# Patient Record
Sex: MALE | Race: WHITE | NOT HISPANIC OR LATINO | Employment: FULL TIME | URBAN - METROPOLITAN AREA
[De-identification: names, ages, dates, MRNs, and addresses within clinical notes are randomized per-mention and may not be internally consistent; named-entity substitution may affect disease eponyms.]

---

## 2019-11-02 ENCOUNTER — HOSPITAL ENCOUNTER (OUTPATIENT)
Dept: RADIOLOGY | Facility: MEDICAL CENTER | Age: 24
End: 2019-11-02
Attending: PHYSICIAN ASSISTANT
Payer: COMMERCIAL

## 2019-11-02 ENCOUNTER — OFFICE VISIT (OUTPATIENT)
Dept: URGENT CARE | Facility: PHYSICIAN GROUP | Age: 24
End: 2019-11-02
Payer: COMMERCIAL

## 2019-11-02 VITALS
HEIGHT: 71 IN | OXYGEN SATURATION: 95 % | DIASTOLIC BLOOD PRESSURE: 110 MMHG | HEART RATE: 76 BPM | SYSTOLIC BLOOD PRESSURE: 140 MMHG | BODY MASS INDEX: 39.62 KG/M2 | WEIGHT: 283 LBS | TEMPERATURE: 97.5 F

## 2019-11-02 DIAGNOSIS — R05.9 COUGH: ICD-10-CM

## 2019-11-02 DIAGNOSIS — R07.89 CHEST DISCOMFORT: ICD-10-CM

## 2019-11-02 DIAGNOSIS — H66.003 NON-RECURRENT ACUTE SUPPURATIVE OTITIS MEDIA OF BOTH EARS WITHOUT SPONTANEOUS RUPTURE OF TYMPANIC MEMBRANES: ICD-10-CM

## 2019-11-02 DIAGNOSIS — J06.9 UPPER RESPIRATORY TRACT INFECTION, UNSPECIFIED TYPE: ICD-10-CM

## 2019-11-02 PROCEDURE — 99204 OFFICE O/P NEW MOD 45 MIN: CPT | Performed by: PHYSICIAN ASSISTANT

## 2019-11-02 PROCEDURE — 71046 X-RAY EXAM CHEST 2 VIEWS: CPT

## 2019-11-02 PROCEDURE — 93000 ELECTROCARDIOGRAM COMPLETE: CPT | Performed by: PHYSICIAN ASSISTANT

## 2019-11-02 RX ORDER — FLUTICASONE PROPIONATE 50 MCG
1 SPRAY, SUSPENSION (ML) NASAL DAILY
Qty: 16 G | Refills: 0 | Status: SHIPPED | OUTPATIENT
Start: 2019-11-02 | End: 2020-08-24

## 2019-11-02 RX ORDER — CODEINE PHOSPHATE/GUAIFENESIN 10-100MG/5
5 LIQUID (ML) ORAL 3 TIMES DAILY PRN
Qty: 120 ML | Refills: 0 | Status: SHIPPED | OUTPATIENT
Start: 2019-11-02 | End: 2019-11-09

## 2019-11-02 RX ORDER — BENZONATATE 100 MG/1
100 CAPSULE ORAL 3 TIMES DAILY PRN
Qty: 30 CAP | Refills: 0 | Status: SHIPPED | OUTPATIENT
Start: 2019-11-02 | End: 2020-08-24

## 2019-11-02 RX ORDER — AMOXICILLIN AND CLAVULANATE POTASSIUM 875; 125 MG/1; MG/1
1 TABLET, FILM COATED ORAL 2 TIMES DAILY
Qty: 20 TAB | Refills: 0 | Status: SHIPPED | OUTPATIENT
Start: 2019-11-02 | End: 2019-11-12

## 2019-11-02 ASSESSMENT — ENCOUNTER SYMPTOMS
RHINORRHEA: 1
SINUS PAIN: 1
ABDOMINAL PAIN: 0
CONSTIPATION: 0
SHORTNESS OF BREATH: 0
HEMOPTYSIS: 0
DIZZINESS: 0
VOMITING: 0
SORE THROAT: 1
BLOOD IN STOOL: 0
EYES NEGATIVE: 1
HEARTBURN: 0
DIARRHEA: 0
SPUTUM PRODUCTION: 1
FEVER: 1
CHILLS: 1
WHEEZING: 0
HEADACHES: 0
COUGH: 1
NAUSEA: 0
PALPITATIONS: 0

## 2019-11-02 NOTE — LETTER
November 2, 2019         Patient: Mitchel Toussaint   YOB: 1995   Date of Visit: 11/2/2019           To Whom it May Concern:    Mitchel Toussaint was seen in my clinic on 11/2/2019. He is excused 0/11/02/2019, 11/03/2019 and 11/04/2019.    If you have any questions or concerns, please don't hesitate to call.        Sincerely,           Shorty Razo P.A.-C.  Electronically Signed

## 2019-11-02 NOTE — PROGRESS NOTES
Subjective:   Mitchel Toussaint is a 24 y.o. male who presents for Nasal Congestion (Pt reports chest congestion, sore thoat, cough, chill, body aches, fatigue. onset four days.)        Patient complains of central chest discomfort, cough, sore throat, chills, sweats, body aches, fatigue x4 days.  He states chest discomfort is substernal, constant, aching.  3 out of 10.  Does not change with position or exertion. Pain worsened by coughing. Does not think it worsens with eating or drinking. He is also having some intermittent ear pain for the past several days.  No personal cardiac history.  His grandmother  of heart attack at age 55.  Patient does use chewing tobacco.    URI    This is a new problem. The current episode started in the past 7 days. The problem has been rapidly worsening. Maximum temperature: subjective fever. The fever has been present for 1 to 2 days. Associated symptoms include chest pain, congestion, coughing, ear pain, a plugged ear sensation, rhinorrhea, sinus pain and a sore throat. Pertinent negatives include no abdominal pain, diarrhea, dysuria, headaches, joint pain, joint swelling, nausea, rash, sneezing, vomiting or wheezing. He has tried nothing for the symptoms. The treatment provided no relief.     Review of Systems   Constitutional: Positive for chills, fever and malaise/fatigue.   HENT: Positive for congestion, ear pain, rhinorrhea, sinus pain and sore throat. Negative for sneezing.    Eyes: Negative.    Respiratory: Positive for cough and sputum production. Negative for hemoptysis, shortness of breath and wheezing.    Cardiovascular: Positive for chest pain. Negative for palpitations and leg swelling.   Gastrointestinal: Negative for abdominal pain, blood in stool, constipation, diarrhea, heartburn, nausea and vomiting.   Genitourinary: Negative for dysuria.   Musculoskeletal: Negative for joint pain.   Skin: Negative for rash.   Neurological: Negative for dizziness  "and headaches.   All other systems reviewed and are negative.      PMH:  has no past medical history of Asthma.  MEDS:   Current Outpatient Medications:   •  hyoscyamine-maalox plus-lidocaine viscous (GI COCKTAIL), Take 15 mL by mouth Once for 1 dose., Disp: 15 mL, Rfl: 0  •  amoxicillin-clavulanate (AUGMENTIN) 875-125 MG Tab, Take 1 Tab by mouth 2 times a day for 10 days., Disp: 20 Tab, Rfl: 0  •  fluticasone (FLONASE) 50 MCG/ACT nasal spray, Spray 1 Spray in nose every day., Disp: 16 g, Rfl: 0  •  benzonatate (TESSALON) 100 MG Cap, Take 1 Cap by mouth 3 times a day as needed., Disp: 30 Cap, Rfl: 0  •  guaifenesin-codeine (TUSSI-ORGANIDIN NR) 100-10 MG/5ML syrup, Take 5 mL by mouth 3 times a day as needed for Cough for up to 7 days., Disp: 120 mL, Rfl: 0  •  oxycodone-acetaminophen (PERCOCET) 5-325 MG TABS, Take 1-2 Tabs by mouth every four hours as needed., Disp: , Rfl:   ALLERGIES: No Known Allergies  SURGHX:   Past Surgical History:   Procedure Laterality Date   • IRRIGATION & DEBRIDEMENT GENERAL  5/21/2013    Performed by Dwight Pool M.D. at SURGERY SAME DAY [facility] ORS     SOCHX:  reports that he quit smoking about 4 years ago. His smokeless tobacco use includes chew. He reports that he drinks alcohol. He reports that he has current or past drug history.  FH: Family history was reviewed, no pertinent findings to report   Objective:   /110 (BP Location: Left arm, Patient Position: Standing, BP Cuff Size: Large adult)   Pulse 76   Temp 36.4 °C (97.5 °F) (Temporal)   Ht 1.803 m (5' 11\")   Wt (!) 128.4 kg (283 lb)   SpO2 95%   BMI 39.47 kg/m²   Physical Exam   Constitutional: He is oriented to person, place, and time. Vital signs are normal. He appears well-developed and well-nourished.  Non-toxic appearance. No distress.   HENT:   Head: Normocephalic and atraumatic.   Right Ear: External ear and ear canal normal.   Left Ear: External ear and ear canal normal.   Nose: Mucosal edema and " rhinorrhea present. Right sinus exhibits maxillary sinus tenderness. Left sinus exhibits maxillary sinus tenderness.   Mouth/Throat: Uvula is midline and mucous membranes are normal. Posterior oropharyngeal erythema present.   TMs are bulging, opaque white with significant central injection bilaterally.  Loss of light reflex bilaterally.   Eyes: Conjunctivae and lids are normal.   Neck: Neck supple.   Cardiovascular: Normal rate, regular rhythm, S1 normal, S2 normal and normal heart sounds. Exam reveals no gallop and no friction rub.   No murmur heard.  Chest wall and sternum nontender to palpation.  No change in symptoms when patient is made to lean forward or when moved into supine position.   Pulmonary/Chest: Effort normal and breath sounds normal. No respiratory distress. He has no decreased breath sounds. He has no wheezes. He has no rhonchi. He has no rales.   Abdominal: Normal appearance.   Musculoskeletal:   Lower extremities are nonedematous and nontender to palpation.   Lymphadenopathy:     He has no cervical adenopathy.        Right cervical: No superficial cervical and no posterior cervical adenopathy present.       Left cervical: No superficial cervical and no posterior cervical adenopathy present.   Neurological: He is alert and oriented to person, place, and time. No cranial nerve deficit or sensory deficit.   Skin: Skin is warm, dry and intact. Capillary refill takes less than 2 seconds.   Psychiatric: He has a normal mood and affect. His speech is normal and behavior is normal. Judgment and thought content normal. Cognition and memory are normal.   Vitals reviewed.        Assessment/Plan:   1. Chest discomfort  - EKG - Clinic Performed  - DX-CHEST-2 VIEWS; Future  - hyoscyamine-maalox plus-lidocaine viscous (GI COCKTAIL); Take 15 mL by mouth Once for 1 dose.  Dispense: 15 mL; Refill: 0  - REFERRAL TO CARDIOLOGY    2. Upper respiratory tract infection, unspecified type  - amoxicillin-clavulanate  (AUGMENTIN) 875-125 MG Tab; Take 1 Tab by mouth 2 times a day for 10 days.  Dispense: 20 Tab; Refill: 0  - fluticasone (FLONASE) 50 MCG/ACT nasal spray; Spray 1 Spray in nose every day.  Dispense: 16 g; Refill: 0    3. Non-recurrent acute suppurative otitis media of both ears without spontaneous rupture of tympanic membranes  - amoxicillin-clavulanate (AUGMENTIN) 875-125 MG Tab; Take 1 Tab by mouth 2 times a day for 10 days.  Dispense: 20 Tab; Refill: 0  - fluticasone (FLONASE) 50 MCG/ACT nasal spray; Spray 1 Spray in nose every day.  Dispense: 16 g; Refill: 0    4. Cough  - benzonatate (TESSALON) 100 MG Cap; Take 1 Cap by mouth 3 times a day as needed.  Dispense: 30 Cap; Refill: 0  - guaifenesin-codeine (TUSSI-ORGANIDIN NR) 100-10 MG/5ML syrup; Take 5 mL by mouth 3 times a day as needed for Cough for up to 7 days.  Dispense: 120 mL; Refill: 0    Patient presents primarily with concerns about upper respiratory symptoms however he is having some new onset, mild atypical chest pain.  Physical exam consistent with URI and bilateral acute otitis media.  Cardiac auscultation unremarkable.  No positional changes in pain.  EKG and chest x-ray obtained to further evaluate.    EKG: NSR, rate: 68  , normal axis, mild widening of QRS, normal intervals, no ST segment elevation or depression, no hypertrophy.    FINDINGS:    The cardiac silhouette  and mediastinal contours are normal.    No discrete opacity, pleural fluid or pneumothorax.    No suspicious bony lesions.          Impression       No acute cardiopulmonary findings.     Patient advised that chest x-ray is normal but he does have mild widening of the QRS which is suspicion for possible conduction abnormality.  No EKG available for comparison.  Differential discussed with patient to include myocarditis and pericarditis.  However based on physical exam and EKG clinical suspicion for these pathologies is low.    GI cocktail administered in clinic to help determine if the  pain may be emanating from the GI system.  He does report moderate symptomatic improvement with a GI cocktail, but symptoms do not completely resolve.  He is well-appearing and vital signs stable.  He does not appear to be in any immediate danger.  Patient advised that if his symptoms persist I would like him to be evaluated by cardiology.  Referral placed.  He was also given strict ED precautions.  If pain worsens or changes in nature, he develops dyspnea or shortness of breath, nausea, vomiting, lightheadedness, dizziness-he needs to go to the ER for reevaluation.    As coughing seems to be exacerbating his chest discomfort I will prescribe him antitussives to help control this.  Additionally he is started on antibiotics for bilateral ear infections.  Start Flonase.  If patient's symptoms worsen at any point or fail to significantly improve in the next 72 hours he should return to clinic for reevaluation.     Differential diagnosis, natural history, supportive care, and indications for immediate follow-up discussed.

## 2020-08-24 ENCOUNTER — OFFICE VISIT (OUTPATIENT)
Dept: URGENT CARE | Facility: PHYSICIAN GROUP | Age: 25
End: 2020-08-24
Payer: COMMERCIAL

## 2020-08-24 VITALS
TEMPERATURE: 97.7 F | HEIGHT: 71 IN | SYSTOLIC BLOOD PRESSURE: 108 MMHG | OXYGEN SATURATION: 97 % | HEART RATE: 66 BPM | DIASTOLIC BLOOD PRESSURE: 80 MMHG | WEIGHT: 300 LBS | RESPIRATION RATE: 16 BRPM | BODY MASS INDEX: 42 KG/M2

## 2020-08-24 DIAGNOSIS — G47.00 INSOMNIA, UNSPECIFIED TYPE: ICD-10-CM

## 2020-08-24 PROCEDURE — 99214 OFFICE O/P EST MOD 30 MIN: CPT | Performed by: PHYSICIAN ASSISTANT

## 2020-08-24 RX ORDER — HYDROXYZINE HYDROCHLORIDE 25 MG/1
25 TABLET, FILM COATED ORAL
Qty: 30 TAB | Refills: 1 | Status: SHIPPED | OUTPATIENT
Start: 2020-08-24 | End: 2020-09-23

## 2020-08-24 ASSESSMENT — ENCOUNTER SYMPTOMS
DEPRESSION: 0
SHORTNESS OF BREATH: 0
ABDOMINAL PAIN: 0
NERVOUS/ANXIOUS: 1
HEADACHES: 0
INSOMNIA: 1
DIARRHEA: 0
NAUSEA: 0
CONSTIPATION: 0
FEVER: 0
HALLUCINATIONS: 0
VOMITING: 0
SORE THROAT: 0
EYE PAIN: 0
CHILLS: 0
COUGH: 0
MYALGIAS: 0

## 2020-08-24 NOTE — PATIENT INSTRUCTIONS
· SLEEP HYGIENE!   · 1 cup of coffee only when waking up  · No screen time before bed, no bright lights 2 hours before bed  · If you cannot sleep get out of bed and read in the next room.  Use your bed only for sleeping and intercourse  · No daytime napping  · Wake up at the same time every day regardless of how much you've slept  · Stress reduction  · Exercise during the day no matter what - you need to get tired in order to sleep  · No alcohol  · - try noise machine in bedroom ([employer] or similar)    MEDICATIONS  · Melatonin 3mg 1 hour before bed  · Hot shower one hour before bed  · Magnesium glycinate (not citrate!) before bed  · Trial of hydroxyzine - take 1 hour before bed.

## 2020-08-25 NOTE — PROGRESS NOTES
Subjective:   Mitchel Toussaint is a 25 y.o. male who presents for Unable to Sleep ([id number], R wrist [id number])      This is a 25-year-old male has had increased stress lately and reports that he has had difficulty sleeping for around 3 weeks.  He has had around 3 hours of sleep per night and reports that he typically has difficulty falling asleep.  He reports that he has been compensating by drinking 4 to 5 cups of coffee a day.  He has not had any night sweats.  He is very concerned because he feels uncomfortable during the day and feels a lack of energy.  He said difficulty focusing.  He has not tried any over-the-counter supplements aside from a CBD supplement which did not seem to help      Review of Systems   Constitutional: Negative for chills, fever and malaise/fatigue.   HENT: Negative for congestion, ear pain and sore throat.    Eyes: Negative for pain.   Respiratory: Negative for cough and shortness of breath.    Cardiovascular: Negative for chest pain.   Gastrointestinal: Negative for abdominal pain, constipation, diarrhea, nausea and vomiting.   Genitourinary: Negative for dysuria.   Musculoskeletal: Negative for myalgias.   Skin: Negative for rash.   Neurological: Negative for headaches.   Psychiatric/Behavioral: Negative for depression, hallucinations and suicidal ideas. The patient is nervous/anxious and has insomnia.        Medications:    • hydrOXYzine HCl Tabs  • oxyCODONE-acetaminophen Tabs    Allergies: Patient has no known allergies.    Problem List: Mitchel Toussaint has Pilonidal cyst with abscess on their problem list.    Surgical History:  Past Surgical History:   Procedure Laterality Date   • IRRIGATION & DEBRIDEMENT GENERAL  5/21/2013    Performed by Dwight Pool M.D. at SURGERY SAME DAY [facility]       Past Social Hx: Mitchel Toussaint  reports that he quit smoking about 4 years ago. His smokeless tobacco use includes chew. He reports  "current alcohol use. He reports previous drug use.     Past Family Hx:  Mitchel Toussaint family history is not on file.     Problem list, medications, and allergies reviewed by myself today in Epic.     Objective:     /80   Pulse 66   Temp 36.5 °C (97.7 °F) (Temporal)   Resp 16   Ht 1.803 m (5' 11\")   Wt (!) 136.1 kg (300 lb)   SpO2 97%   BMI 41.84 kg/m²     Physical Exam  Vitals signs reviewed.   Constitutional:       Appearance: Normal appearance.   HENT:      Head: Normocephalic and atraumatic.      Right Ear: External ear normal.      Left Ear: External ear normal.      Nose: Nose normal.      Mouth/Throat:      Mouth: Mucous membranes are moist.   Eyes:      Conjunctiva/sclera: Conjunctivae normal.   Cardiovascular:      Rate and Rhythm: Normal rate and regular rhythm.      Heart sounds: Normal heart sounds.   Pulmonary:      Effort: Pulmonary effort is normal.      Breath sounds: Normal breath sounds.   Skin:     General: Skin is warm and dry.      Capillary Refill: Capillary refill takes less than 2 seconds.   Neurological:      General: No focal deficit present.      Mental Status: He is alert and oriented to person, place, and time.         Assessment/Plan:     Diagnosis and associated orders:     1. Insomnia, unspecified type  REFERRAL TO FOLLOW-UP WITH PRIMARY CARE    hydrOXYzine HCl (ATARAX) 25 MG Tab      Comments/MDM:     • See AVS  • I had a very long conversation (greater than 15 minutes) of face-to-face counseling about sleep hygiene, lifestyle changes, stress reduction, specific recommendations as well as over-the-counter things for the patient to try.  I provided him the printed AVS and went over the instructions with him and gave him return precautions.  I also put in referral for primary care         Differential diagnosis, natural history, supportive care, and indications for immediate follow-up discussed.    Advised the patient to follow-up with the primary care " physician for recheck, reevaluation, and consideration of further management.    Please note that this dictation was created using voice recognition software. I have made a reasonable attempt to correct obvious errors, but I expect that there are errors of grammar and possibly content that I did not discover before finalizing the note.    This note was electronically signed by Abdoulaye Yang PA-C

## 2020-10-05 ENCOUNTER — NURSE TRIAGE (OUTPATIENT)
Dept: HEALTH INFORMATION MANAGEMENT | Facility: OTHER | Age: 25
End: 2020-10-05

## 2020-10-05 NOTE — TELEPHONE ENCOUNTER
1. Caller Name: Mitchel Toussaint              Call Back Number: [phone]  [facility] PCP or Specialty Provider: No          2.  In the last two weeks, has the patient had any new or worsening symptoms (not explained by alternative diagnosis)? Yes, the patient reports the following COVID-19 consistent symptoms: congestion or runny nose and nausea, left thumb swelling.     3.  Does patient have any comoribidities? None     4.  Has the patient traveled in the last 14 days OR had any known contact with someone who is suspected or confirmed to have COVID-19?  Yes, exposed to friend who tested positive for Covid.     5. POTENTIAL PUI (LOW): Advised to perform self care, monitor for worsening symptoms and to call back in 3 days if no improvement. Instructed to self isolate and contact [facility] at [phone]         Note routed to [facility] Provider: [facility] only.

## 2021-04-28 ENCOUNTER — ANESTHESIA EVENT (OUTPATIENT)
Dept: SURGERY | Facility: MEDICAL CENTER | Age: 26
End: 2021-04-28

## 2021-04-28 ENCOUNTER — APPOINTMENT (OUTPATIENT)
Dept: RADIOLOGY | Facility: MEDICAL CENTER | Age: 26
End: 2021-04-28
Attending: EMERGENCY MEDICINE

## 2021-04-28 ENCOUNTER — HOSPITAL ENCOUNTER (OUTPATIENT)
Facility: MEDICAL CENTER | Age: 26
End: 2021-04-29
Attending: EMERGENCY MEDICINE | Admitting: SURGERY

## 2021-04-28 ENCOUNTER — ANESTHESIA (OUTPATIENT)
Dept: SURGERY | Facility: MEDICAL CENTER | Age: 26
End: 2021-04-28

## 2021-04-28 DIAGNOSIS — G89.18 POSTOPERATIVE PAIN: ICD-10-CM

## 2021-04-28 DIAGNOSIS — K81.9 CHOLECYSTITIS: ICD-10-CM

## 2021-04-28 LAB
ALBUMIN SERPL BCP-MCNC: 4.8 G/DL (ref 3.2–4.9)
ALBUMIN/GLOB SERPL: 1.5 G/DL
ALP SERPL-CCNC: 84 U/L (ref 30–99)
ALT SERPL-CCNC: 80 U/L (ref 2–50)
ANION GAP SERPL CALC-SCNC: 15 MMOL/L (ref 7–16)
AST SERPL-CCNC: 39 U/L (ref 12–45)
BASOPHILS # BLD AUTO: 0.2 % (ref 0–1.8)
BASOPHILS # BLD: 0.02 K/UL (ref 0–0.12)
BILIRUB SERPL-MCNC: 0.8 MG/DL (ref 0.1–1.5)
BUN SERPL-MCNC: 18 MG/DL (ref 8–22)
CALCIUM SERPL-MCNC: 9.5 MG/DL (ref 8.5–10.5)
CHLORIDE SERPL-SCNC: 105 MMOL/L (ref 96–112)
CO2 SERPL-SCNC: 20 MMOL/L (ref 20–33)
CREAT SERPL-MCNC: 0.67 MG/DL (ref 0.5–1.4)
EKG IMPRESSION: NORMAL
EOSINOPHIL # BLD AUTO: 0.15 K/UL (ref 0–0.51)
EOSINOPHIL NFR BLD: 1.4 % (ref 0–6.9)
ERYTHROCYTE [DISTWIDTH] IN BLOOD BY AUTOMATED COUNT: 40.7 FL (ref 35.9–50)
GLOBULIN SER CALC-MCNC: 3.1 G/DL (ref 1.9–3.5)
GLUCOSE SERPL-MCNC: 115 MG/DL (ref 65–99)
HCT VFR BLD AUTO: 48.3 % (ref 42–52)
HGB BLD-MCNC: 16.3 G/DL (ref 14–18)
IMM GRANULOCYTES # BLD AUTO: 0.05 K/UL (ref 0–0.11)
IMM GRANULOCYTES NFR BLD AUTO: 0.5 % (ref 0–0.9)
LIPASE SERPL-CCNC: 24 U/L (ref 11–82)
LYMPHOCYTES # BLD AUTO: 0.45 K/UL (ref 1–4.8)
LYMPHOCYTES NFR BLD: 4.1 % (ref 22–41)
MCH RBC QN AUTO: 28.3 PG (ref 27–33)
MCHC RBC AUTO-ENTMCNC: 33.7 G/DL (ref 33.7–35.3)
MCV RBC AUTO: 84 FL (ref 81.4–97.8)
MONOCYTES # BLD AUTO: 0.41 K/UL (ref 0–0.85)
MONOCYTES NFR BLD AUTO: 3.7 % (ref 0–13.4)
NEUTROPHILS # BLD AUTO: 9.91 K/UL (ref 1.82–7.42)
NEUTROPHILS NFR BLD: 90.1 % (ref 44–72)
NRBC # BLD AUTO: 0 K/UL
NRBC BLD-RTO: 0 /100 WBC
PLATELET # BLD AUTO: 291 K/UL (ref 164–446)
PMV BLD AUTO: 9.2 FL (ref 9–12.9)
POTASSIUM SERPL-SCNC: 3.8 MMOL/L (ref 3.6–5.5)
PROT SERPL-MCNC: 7.9 G/DL (ref 6–8.2)
RBC # BLD AUTO: 5.75 M/UL (ref 4.7–6.1)
SARS-COV+SARS-COV-2 AG RESP QL IA.RAPID: NOTDETECTED
SARS-COV-2 RNA RESP QL NAA+PROBE: NOTDETECTED
SODIUM SERPL-SCNC: 140 MMOL/L (ref 135–145)
SPECIMEN SOURCE: NORMAL
SPECIMEN SOURCE: NORMAL
WBC # BLD AUTO: 11 K/UL (ref 4.8–10.8)

## 2021-04-28 PROCEDURE — 500514 HCHG ENDOCLIP: Performed by: SURGERY

## 2021-04-28 PROCEDURE — 160035 HCHG PACU - 1ST 60 MINS PHASE I: Performed by: SURGERY

## 2021-04-28 PROCEDURE — 160048 HCHG OR STATISTICAL LEVEL 1-5: Performed by: SURGERY

## 2021-04-28 PROCEDURE — 88304 TISSUE EXAM BY PATHOLOGIST: CPT

## 2021-04-28 PROCEDURE — 501338 HCHG SHEARS, ENDO: Performed by: SURGERY

## 2021-04-28 PROCEDURE — 83690 ASSAY OF LIPASE: CPT

## 2021-04-28 PROCEDURE — 87426 SARSCOV CORONAVIRUS AG IA: CPT

## 2021-04-28 PROCEDURE — 160009 HCHG ANES TIME/MIN: Performed by: SURGERY

## 2021-04-28 PROCEDURE — 501568 HCHG TROCAR, BLUNTPORT 12MM: Performed by: SURGERY

## 2021-04-28 PROCEDURE — 700105 HCHG RX REV CODE 258: Performed by: ANESTHESIOLOGY

## 2021-04-28 PROCEDURE — 700111 HCHG RX REV CODE 636 W/ 250 OVERRIDE (IP): Performed by: ANESTHESIOLOGY

## 2021-04-28 PROCEDURE — 160029 HCHG SURGERY MINUTES - 1ST 30 MINS LEVEL 4: Performed by: SURGERY

## 2021-04-28 PROCEDURE — 93005 ELECTROCARDIOGRAM TRACING: CPT | Performed by: EMERGENCY MEDICINE

## 2021-04-28 PROCEDURE — 99291 CRITICAL CARE FIRST HOUR: CPT

## 2021-04-28 PROCEDURE — 80053 COMPREHEN METABOLIC PANEL: CPT

## 2021-04-28 PROCEDURE — 96375 TX/PRO/DX INJ NEW DRUG ADDON: CPT

## 2021-04-28 PROCEDURE — 501838 HCHG SUTURE GENERAL: Performed by: SURGERY

## 2021-04-28 PROCEDURE — 700111 HCHG RX REV CODE 636 W/ 250 OVERRIDE (IP): Performed by: EMERGENCY MEDICINE

## 2021-04-28 PROCEDURE — 502571 HCHG PACK, LAP CHOLE: Performed by: SURGERY

## 2021-04-28 PROCEDURE — 160036 HCHG PACU - EA ADDL 30 MINS PHASE I: Performed by: SURGERY

## 2021-04-28 PROCEDURE — 96374 THER/PROPH/DIAG INJ IV PUSH: CPT

## 2021-04-28 PROCEDURE — 160002 HCHG RECOVERY MINUTES (STAT): Performed by: SURGERY

## 2021-04-28 PROCEDURE — 501583 HCHG TROCAR, THRD CAN&SEAL 5X100: Performed by: SURGERY

## 2021-04-28 PROCEDURE — 700101 HCHG RX REV CODE 250: Performed by: SURGERY

## 2021-04-28 PROCEDURE — 160041 HCHG SURGERY MINUTES - EA ADDL 1 MIN LEVEL 4: Performed by: SURGERY

## 2021-04-28 PROCEDURE — 76705 ECHO EXAM OF ABDOMEN: CPT

## 2021-04-28 PROCEDURE — C9803 HOPD COVID-19 SPEC COLLECT: HCPCS | Performed by: EMERGENCY MEDICINE

## 2021-04-28 PROCEDURE — U0005 INFEC AGEN DETEC AMPLI PROBE: HCPCS

## 2021-04-28 PROCEDURE — 700105 HCHG RX REV CODE 258: Performed by: EMERGENCY MEDICINE

## 2021-04-28 PROCEDURE — 700105 HCHG RX REV CODE 258: Performed by: SURGERY

## 2021-04-28 PROCEDURE — 700101 HCHG RX REV CODE 250: Performed by: ANESTHESIOLOGY

## 2021-04-28 PROCEDURE — U0003 INFECTIOUS AGENT DETECTION BY NUCLEIC ACID (DNA OR RNA); SEVERE ACUTE RESPIRATORY SYNDROME CORONAVIRUS 2 (SARS-COV-2) (CORONAVIRUS DISEASE [COVID-19]), AMPLIFIED PROBE TECHNIQUE, MAKING USE OF HIGH THROUGHPUT TECHNOLOGIES AS DESCRIBED BY CMS-2020-01-R: HCPCS

## 2021-04-28 PROCEDURE — 85025 COMPLETE CBC W/AUTO DIFF WBC: CPT

## 2021-04-28 PROCEDURE — 501399 HCHG SPECIMAN BAG, ENDO CATC: Performed by: SURGERY

## 2021-04-28 PROCEDURE — G0378 HOSPITAL OBSERVATION PER HR: HCPCS

## 2021-04-28 RX ORDER — ACETAMINOPHEN 325 MG/1
650 TABLET ORAL EVERY 6 HOURS
Qty: 60 TABLET | Refills: 0 | Status: SHIPPED | OUTPATIENT
Start: 2021-04-28 | End: 2021-08-09

## 2021-04-28 RX ORDER — MAGNESIUM HYDROXIDE 1200 MG/15ML
LIQUID ORAL
Status: COMPLETED | OUTPATIENT
Start: 2021-04-28 | End: 2021-04-28

## 2021-04-28 RX ORDER — DEXMEDETOMIDINE HYDROCHLORIDE 100 UG/ML
INJECTION, SOLUTION INTRAVENOUS PRN
Status: DISCONTINUED | OUTPATIENT
Start: 2021-04-28 | End: 2021-04-28 | Stop reason: SURG

## 2021-04-28 RX ORDER — DIPHENHYDRAMINE HYDROCHLORIDE 50 MG/ML
25 INJECTION INTRAMUSCULAR; INTRAVENOUS EVERY 6 HOURS PRN
Status: DISCONTINUED | OUTPATIENT
Start: 2021-04-28 | End: 2021-04-29 | Stop reason: HOSPADM

## 2021-04-28 RX ORDER — SODIUM CHLORIDE, SODIUM LACTATE, POTASSIUM CHLORIDE, CALCIUM CHLORIDE 600; 310; 30; 20 MG/100ML; MG/100ML; MG/100ML; MG/100ML
INJECTION, SOLUTION INTRAVENOUS CONTINUOUS
Status: DISCONTINUED | OUTPATIENT
Start: 2021-04-28 | End: 2021-04-28 | Stop reason: HOSPADM

## 2021-04-28 RX ORDER — HYDROMORPHONE HYDROCHLORIDE 1 MG/ML
0.5 INJECTION, SOLUTION INTRAMUSCULAR; INTRAVENOUS; SUBCUTANEOUS
Status: DISCONTINUED | OUTPATIENT
Start: 2021-04-28 | End: 2021-04-29 | Stop reason: HOSPADM

## 2021-04-28 RX ORDER — ONDANSETRON 2 MG/ML
4 INJECTION INTRAMUSCULAR; INTRAVENOUS ONCE
Status: COMPLETED | OUTPATIENT
Start: 2021-04-28 | End: 2021-04-28

## 2021-04-28 RX ORDER — DEXTROSE MONOHYDRATE, SODIUM CHLORIDE, AND POTASSIUM CHLORIDE 50; 1.49; 4.5 G/1000ML; G/1000ML; G/1000ML
INJECTION, SOLUTION INTRAVENOUS CONTINUOUS
Status: DISCONTINUED | OUTPATIENT
Start: 2021-04-29 | End: 2021-04-28

## 2021-04-28 RX ORDER — HALOPERIDOL 5 MG/ML
1 INJECTION INTRAMUSCULAR
Status: DISCONTINUED | OUTPATIENT
Start: 2021-04-28 | End: 2021-04-28 | Stop reason: HOSPADM

## 2021-04-28 RX ORDER — DIPHENHYDRAMINE HCL 25 MG
25 TABLET ORAL EVERY 6 HOURS PRN
Status: DISCONTINUED | OUTPATIENT
Start: 2021-04-28 | End: 2021-04-29 | Stop reason: HOSPADM

## 2021-04-28 RX ORDER — ROCURONIUM BROMIDE 10 MG/ML
INJECTION, SOLUTION INTRAVENOUS PRN
Status: DISCONTINUED | OUTPATIENT
Start: 2021-04-28 | End: 2021-04-28 | Stop reason: SURG

## 2021-04-28 RX ORDER — SCOLOPAMINE TRANSDERMAL SYSTEM 1 MG/1
1 PATCH, EXTENDED RELEASE TRANSDERMAL
Status: DISCONTINUED | OUTPATIENT
Start: 2021-04-28 | End: 2021-04-29 | Stop reason: HOSPADM

## 2021-04-28 RX ORDER — OXYCODONE HYDROCHLORIDE 5 MG/1
5 TABLET ORAL
Status: DISCONTINUED | OUTPATIENT
Start: 2021-04-28 | End: 2021-04-29 | Stop reason: HOSPADM

## 2021-04-28 RX ORDER — SODIUM CHLORIDE, SODIUM LACTATE, POTASSIUM CHLORIDE, CALCIUM CHLORIDE 600; 310; 30; 20 MG/100ML; MG/100ML; MG/100ML; MG/100ML
1000 INJECTION, SOLUTION INTRAVENOUS ONCE
Status: COMPLETED | OUTPATIENT
Start: 2021-04-28 | End: 2021-04-28

## 2021-04-28 RX ORDER — BUPIVACAINE HYDROCHLORIDE AND EPINEPHRINE 5; 5 MG/ML; UG/ML
INJECTION, SOLUTION EPIDURAL; INTRACAUDAL; PERINEURAL
Status: DISCONTINUED | OUTPATIENT
Start: 2021-04-28 | End: 2021-04-28 | Stop reason: HOSPADM

## 2021-04-28 RX ORDER — LIDOCAINE HYDROCHLORIDE 40 MG/ML
SOLUTION TOPICAL PRN
Status: DISCONTINUED | OUTPATIENT
Start: 2021-04-28 | End: 2021-04-28 | Stop reason: SURG

## 2021-04-28 RX ORDER — MIDAZOLAM HYDROCHLORIDE 1 MG/ML
INJECTION INTRAMUSCULAR; INTRAVENOUS PRN
Status: DISCONTINUED | OUTPATIENT
Start: 2021-04-28 | End: 2021-04-28 | Stop reason: SURG

## 2021-04-28 RX ORDER — SODIUM CHLORIDE, SODIUM LACTATE, POTASSIUM CHLORIDE, CALCIUM CHLORIDE 600; 310; 30; 20 MG/100ML; MG/100ML; MG/100ML; MG/100ML
INJECTION, SOLUTION INTRAVENOUS
Status: DISCONTINUED | OUTPATIENT
Start: 2021-04-28 | End: 2021-04-28 | Stop reason: SURG

## 2021-04-28 RX ORDER — CALCIUM CARBONATE 500 MG/1
500 TABLET, CHEWABLE ORAL
Status: DISCONTINUED | OUTPATIENT
Start: 2021-04-28 | End: 2021-04-29 | Stop reason: HOSPADM

## 2021-04-28 RX ORDER — KETOROLAC TROMETHAMINE 30 MG/ML
INJECTION, SOLUTION INTRAMUSCULAR; INTRAVENOUS PRN
Status: DISCONTINUED | OUTPATIENT
Start: 2021-04-28 | End: 2021-04-28 | Stop reason: SURG

## 2021-04-28 RX ORDER — LABETALOL HYDROCHLORIDE 5 MG/ML
INJECTION, SOLUTION INTRAVENOUS PRN
Status: DISCONTINUED | OUTPATIENT
Start: 2021-04-28 | End: 2021-04-28 | Stop reason: SURG

## 2021-04-28 RX ORDER — OXYCODONE HYDROCHLORIDE 10 MG/1
10 TABLET ORAL
Status: DISCONTINUED | OUTPATIENT
Start: 2021-04-28 | End: 2021-04-29 | Stop reason: HOSPADM

## 2021-04-28 RX ORDER — OXYCODONE HYDROCHLORIDE 5 MG/1
5 TABLET ORAL EVERY 4 HOURS PRN
Qty: 12 TABLET | Refills: 0 | Status: SHIPPED | OUTPATIENT
Start: 2021-04-28 | End: 2021-05-01

## 2021-04-28 RX ORDER — ONDANSETRON 2 MG/ML
INJECTION INTRAMUSCULAR; INTRAVENOUS PRN
Status: DISCONTINUED | OUTPATIENT
Start: 2021-04-28 | End: 2021-04-28 | Stop reason: SURG

## 2021-04-28 RX ORDER — DIPHENHYDRAMINE HYDROCHLORIDE 50 MG/ML
12.5 INJECTION INTRAMUSCULAR; INTRAVENOUS
Status: DISCONTINUED | OUTPATIENT
Start: 2021-04-28 | End: 2021-04-28 | Stop reason: HOSPADM

## 2021-04-28 RX ORDER — SODIUM CHLORIDE, SODIUM LACTATE, POTASSIUM CHLORIDE, CALCIUM CHLORIDE 600; 310; 30; 20 MG/100ML; MG/100ML; MG/100ML; MG/100ML
INJECTION, SOLUTION INTRAVENOUS
Status: COMPLETED | OUTPATIENT
Start: 2021-04-28 | End: 2021-04-28

## 2021-04-28 RX ORDER — SODIUM CHLORIDE, SODIUM LACTATE, POTASSIUM CHLORIDE, AND CALCIUM CHLORIDE .6; .31; .03; .02 G/100ML; G/100ML; G/100ML; G/100ML
500 INJECTION, SOLUTION INTRAVENOUS
Status: DISCONTINUED | OUTPATIENT
Start: 2021-04-28 | End: 2021-04-29 | Stop reason: HOSPADM

## 2021-04-28 RX ORDER — ONDANSETRON 2 MG/ML
4 INJECTION INTRAMUSCULAR; INTRAVENOUS
Status: DISCONTINUED | OUTPATIENT
Start: 2021-04-28 | End: 2021-04-28 | Stop reason: HOSPADM

## 2021-04-28 RX ORDER — IBUPROFEN 600 MG/1
600 TABLET ORAL EVERY 6 HOURS
Qty: 45 TABLET | Refills: 0 | Status: SHIPPED | OUTPATIENT
Start: 2021-04-28 | End: 2021-08-09

## 2021-04-28 RX ORDER — CEFAZOLIN SODIUM 1 G/3ML
INJECTION, POWDER, FOR SOLUTION INTRAMUSCULAR; INTRAVENOUS PRN
Status: DISCONTINUED | OUTPATIENT
Start: 2021-04-28 | End: 2021-04-28 | Stop reason: SURG

## 2021-04-28 RX ORDER — HALOPERIDOL 5 MG/ML
1 INJECTION INTRAMUSCULAR EVERY 6 HOURS PRN
Status: DISCONTINUED | OUTPATIENT
Start: 2021-04-28 | End: 2021-04-29 | Stop reason: HOSPADM

## 2021-04-28 RX ORDER — ACETAMINOPHEN 500 MG
1000 TABLET ORAL EVERY 6 HOURS
Status: DISCONTINUED | OUTPATIENT
Start: 2021-04-29 | End: 2021-04-29 | Stop reason: HOSPADM

## 2021-04-28 RX ORDER — OXYCODONE HCL 5 MG/5 ML
10 SOLUTION, ORAL ORAL
Status: DISCONTINUED | OUTPATIENT
Start: 2021-04-28 | End: 2021-04-28 | Stop reason: HOSPADM

## 2021-04-28 RX ORDER — OXYCODONE HCL 5 MG/5 ML
5 SOLUTION, ORAL ORAL
Status: DISCONTINUED | OUTPATIENT
Start: 2021-04-28 | End: 2021-04-28 | Stop reason: HOSPADM

## 2021-04-28 RX ORDER — ONDANSETRON 2 MG/ML
4 INJECTION INTRAMUSCULAR; INTRAVENOUS EVERY 4 HOURS PRN
Status: DISCONTINUED | OUTPATIENT
Start: 2021-04-28 | End: 2021-04-29 | Stop reason: HOSPADM

## 2021-04-28 RX ORDER — DEXAMETHASONE SODIUM PHOSPHATE 4 MG/ML
4 INJECTION, SOLUTION INTRA-ARTICULAR; INTRALESIONAL; INTRAMUSCULAR; INTRAVENOUS; SOFT TISSUE
Status: DISCONTINUED | OUTPATIENT
Start: 2021-04-28 | End: 2021-04-29 | Stop reason: HOSPADM

## 2021-04-28 RX ORDER — MORPHINE SULFATE 4 MG/ML
4 INJECTION, SOLUTION INTRAMUSCULAR; INTRAVENOUS ONCE
Status: COMPLETED | OUTPATIENT
Start: 2021-04-28 | End: 2021-04-28

## 2021-04-28 RX ORDER — DOCUSATE SODIUM 100 MG/1
100 CAPSULE, LIQUID FILLED ORAL 2 TIMES DAILY
Qty: 60 CAPSULE | Refills: 0 | Status: SHIPPED | OUTPATIENT
Start: 2021-04-28 | End: 2021-08-09

## 2021-04-28 RX ORDER — ACETAMINOPHEN 500 MG
1000 TABLET ORAL EVERY 6 HOURS PRN
Status: DISCONTINUED | OUTPATIENT
Start: 2021-05-04 | End: 2021-04-29 | Stop reason: HOSPADM

## 2021-04-28 RX ORDER — DIPHENHYDRAMINE HYDROCHLORIDE 50 MG/ML
25 INJECTION INTRAMUSCULAR; INTRAVENOUS EVERY 6 HOURS PRN
Status: DISCONTINUED | OUTPATIENT
Start: 2021-04-28 | End: 2021-04-28

## 2021-04-28 RX ORDER — DIPHENHYDRAMINE HCL 25 MG
25 TABLET ORAL EVERY 6 HOURS PRN
Status: DISCONTINUED | OUTPATIENT
Start: 2021-04-28 | End: 2021-04-28

## 2021-04-28 RX ADMIN — ROCURONIUM BROMIDE 80 MG: 10 INJECTION, SOLUTION INTRAVENOUS at 20:43

## 2021-04-28 RX ADMIN — PROPOFOL 200 MG: 10 INJECTION, EMULSION INTRAVENOUS at 20:43

## 2021-04-28 RX ADMIN — LABETALOL HYDROCHLORIDE 10 MG: 5 INJECTION, SOLUTION INTRAVENOUS at 21:20

## 2021-04-28 RX ADMIN — SODIUM CHLORIDE, POTASSIUM CHLORIDE, SODIUM LACTATE AND CALCIUM CHLORIDE: 600; 310; 30; 20 INJECTION, SOLUTION INTRAVENOUS at 20:57

## 2021-04-28 RX ADMIN — MIDAZOLAM HYDROCHLORIDE 2 MG: 1 INJECTION, SOLUTION INTRAMUSCULAR; INTRAVENOUS at 20:43

## 2021-04-28 RX ADMIN — FENTANYL CITRATE 100 MCG: 50 INJECTION, SOLUTION INTRAMUSCULAR; INTRAVENOUS at 20:43

## 2021-04-28 RX ADMIN — FENTANYL CITRATE 50 MCG: 50 INJECTION, SOLUTION INTRAMUSCULAR; INTRAVENOUS at 21:05

## 2021-04-28 RX ADMIN — FENTANYL CITRATE 50 MCG: 50 INJECTION, SOLUTION INTRAMUSCULAR; INTRAVENOUS at 21:55

## 2021-04-28 RX ADMIN — CEFAZOLIN 3 G: 330 INJECTION, POWDER, FOR SOLUTION INTRAMUSCULAR; INTRAVENOUS at 20:46

## 2021-04-28 RX ADMIN — DEXMEDETOMIDINE 50 MCG: 200 INJECTION, SOLUTION INTRAVENOUS at 20:43

## 2021-04-28 RX ADMIN — SUGAMMADEX 200 MG: 100 INJECTION, SOLUTION INTRAVENOUS at 21:33

## 2021-04-28 RX ADMIN — KETOROLAC TROMETHAMINE 30 MG: 30 INJECTION, SOLUTION INTRAMUSCULAR at 21:02

## 2021-04-28 RX ADMIN — ONDANSETRON 4 MG: 2 INJECTION INTRAMUSCULAR; INTRAVENOUS at 21:34

## 2021-04-28 RX ADMIN — FENTANYL CITRATE 50 MCG: 50 INJECTION, SOLUTION INTRAMUSCULAR; INTRAVENOUS at 22:49

## 2021-04-28 RX ADMIN — FENTANYL CITRATE 100 MCG: 50 INJECTION, SOLUTION INTRAMUSCULAR; INTRAVENOUS at 20:54

## 2021-04-28 RX ADMIN — LIDOCAINE HYDROCHLORIDE 4 ML: 40 SOLUTION TOPICAL at 20:45

## 2021-04-28 RX ADMIN — SODIUM CHLORIDE, POTASSIUM CHLORIDE, SODIUM LACTATE AND CALCIUM CHLORIDE 1000 ML: 600; 310; 30; 20 INJECTION, SOLUTION INTRAVENOUS at 17:58

## 2021-04-28 RX ADMIN — ONDANSETRON 4 MG: 2 INJECTION INTRAMUSCULAR; INTRAVENOUS at 17:57

## 2021-04-28 RX ADMIN — LABETALOL HYDROCHLORIDE 15 MG: 5 INJECTION, SOLUTION INTRAVENOUS at 21:23

## 2021-04-28 RX ADMIN — MORPHINE SULFATE 4 MG: 4 INJECTION INTRAVENOUS at 17:57

## 2021-04-28 ASSESSMENT — PAIN DESCRIPTION - PAIN TYPE
TYPE: SURGICAL PAIN

## 2021-04-28 ASSESSMENT — FIBROSIS 4 INDEX: FIB4 SCORE: 0.39

## 2021-04-28 ASSESSMENT — PAIN SCALES - GENERAL: PAIN_LEVEL: 4

## 2021-04-28 NOTE — ED TRIAGE NOTES
"Chief Complaint   Patient presents with   • Abdominal Pain     since this AM. intermittent mid epigastric abd pain, non-radiating.    • Vomiting       Pt ambulatory to triage with steady gait for above complaint. Denies any history.    Pt is alert and oriented, speaking in full sentences, follows commands and responds appropriately to questions. Resp are even and unlabored.     Pt placed in lobby. Pt educated on triage process and encouraged to alert staff for any changes.      BP (!) 178/116   Pulse (!) 105   Temp 36.8 °C (98.3 °F) (Temporal)   Resp 16   Ht 1.803 m (5' 11\")   Wt (!) 153 kg (337 lb 15.4 oz)   SpO2 97%     "

## 2021-04-29 VITALS
WEIGHT: 254.41 LBS | SYSTOLIC BLOOD PRESSURE: 155 MMHG | TEMPERATURE: 97.4 F | BODY MASS INDEX: 35.62 KG/M2 | DIASTOLIC BLOOD PRESSURE: 87 MMHG | HEART RATE: 71 BPM | OXYGEN SATURATION: 92 % | RESPIRATION RATE: 18 BRPM | HEIGHT: 71 IN

## 2021-04-29 LAB
ERYTHROCYTE [DISTWIDTH] IN BLOOD BY AUTOMATED COUNT: 41.7 FL (ref 35.9–50)
HCT VFR BLD AUTO: 41.7 % (ref 42–52)
HGB BLD-MCNC: 13.8 G/DL (ref 14–18)
MCH RBC QN AUTO: 28.2 PG (ref 27–33)
MCHC RBC AUTO-ENTMCNC: 33.1 G/DL (ref 33.7–35.3)
MCV RBC AUTO: 85.3 FL (ref 81.4–97.8)
PATHOLOGY CONSULT NOTE: NORMAL
PLATELET # BLD AUTO: 211 K/UL (ref 164–446)
PMV BLD AUTO: 9 FL (ref 9–12.9)
RBC # BLD AUTO: 4.89 M/UL (ref 4.7–6.1)
WBC # BLD AUTO: 4.9 K/UL (ref 4.8–10.8)

## 2021-04-29 PROCEDURE — 85027 COMPLETE CBC AUTOMATED: CPT

## 2021-04-29 PROCEDURE — G0378 HOSPITAL OBSERVATION PER HR: HCPCS

## 2021-04-29 PROCEDURE — 700102 HCHG RX REV CODE 250 W/ 637 OVERRIDE(OP): Performed by: SURGERY

## 2021-04-29 PROCEDURE — 36415 COLL VENOUS BLD VENIPUNCTURE: CPT

## 2021-04-29 PROCEDURE — A9270 NON-COVERED ITEM OR SERVICE: HCPCS | Performed by: SURGERY

## 2021-04-29 PROCEDURE — 99217 PR OBSERVATION CARE DISCHARGE: CPT | Performed by: STUDENT IN AN ORGANIZED HEALTH CARE EDUCATION/TRAINING PROGRAM

## 2021-04-29 RX ADMIN — ACETAMINOPHEN 1000 MG: 500 TABLET ORAL at 05:08

## 2021-04-29 RX ADMIN — ACETAMINOPHEN 1000 MG: 500 TABLET ORAL at 00:02

## 2021-04-29 ASSESSMENT — LIFESTYLE VARIABLES
ALCOHOL_USE: NO
HOW MANY TIMES IN THE PAST YEAR HAVE YOU HAD 5 OR MORE DRINKS IN A DAY: 0
TOTAL SCORE: 0
DOES PATIENT WANT TO STOP DRINKING: NO
TOTAL SCORE: 0
HAVE PEOPLE ANNOYED YOU BY CRITICIZING YOUR DRINKING: NO
CONSUMPTION TOTAL: NEGATIVE
AVERAGE NUMBER OF DAYS PER WEEK YOU HAVE A DRINK CONTAINING ALCOHOL: 0
TOTAL SCORE: 0
HAVE YOU EVER FELT YOU SHOULD CUT DOWN ON YOUR DRINKING: NO
ON A TYPICAL DAY WHEN YOU DRINK ALCOHOL HOW MANY DRINKS DO YOU HAVE: 0
EVER FELT BAD OR GUILTY ABOUT YOUR DRINKING: NO
EVER HAD A DRINK FIRST THING IN THE MORNING TO STEADY YOUR NERVES TO GET RID OF A HANGOVER: NO

## 2021-04-29 ASSESSMENT — PATIENT HEALTH QUESTIONNAIRE - PHQ9
2. FEELING DOWN, DEPRESSED, IRRITABLE, OR HOPELESS: NOT AT ALL
SUM OF ALL RESPONSES TO PHQ9 QUESTIONS 1 AND 2: 0
1. LITTLE INTEREST OR PLEASURE IN DOING THINGS: NOT AT ALL

## 2021-04-29 NOTE — ANESTHESIA PREPROCEDURE EVALUATION
Relevant Problems   No relevant active problems       Physical Exam    Airway   Mallampati: II  TM distance: >3 FB  Neck ROM: full       Cardiovascular - normal exam  Rhythm: regular  Rate: normal  (-) murmur     Dental - normal exam           Pulmonary - normal exam  Breath sounds clear to auscultation     Abdominal   (+) obese     Neurological - normal exam                 Anesthesia Plan    ASA 3   ASA physical status 3 criteria: morbid obesity - BMI greater than or equal to 40    Plan - general       Airway plan will be ETT          Induction: intravenous    Postoperative Plan: Postoperative administration of opioids is intended.    Pertinent diagnostic labs and testing reviewed    Informed Consent:    Anesthetic plan and risks discussed with patient.    Use of blood products discussed with: patient whom consented to blood products.

## 2021-04-29 NOTE — PROGRESS NOTES
Pt a&ox4,no c/o pain,pt not in distress,discharge instructions &prescriptions given,pt able to follow instructions,questions answered,discharge without any events.Explained post op wendie talley.

## 2021-04-29 NOTE — ANESTHESIA POSTPROCEDURE EVALUATION
Patient: Mitchel Sandy    Procedure Summary     Date: 04/28/21 Room / Location: [place] [id number] / SURGERY [facility]    Anesthesia Start: 2041 Anesthesia Stop: 2145    Procedure: CHOLECYSTECTOMY, LAPAROSCOPIC (Abdomen) Diagnosis: (acute cholecystitis)    Surgeons: Tarik Malik M.D. Responsible Provider: Shayne Cruz M.D.    Anesthesia Type: general ASA Status: 3          Final Anesthesia Type: general  Last vitals  BP   Blood Pressure: 139/90    Temp   37.1 °C (98.7 °F)    Pulse   (!) 108   Resp   18    SpO2   93 %      Anesthesia Post Evaluation    Patient location during evaluation: PACU  Patient participation: complete - patient participated  Level of consciousness: awake and alert  Pain score: 4    Airway patency: patent  Anesthetic complications: no  Cardiovascular status: hemodynamically stable  Respiratory status: acceptable  Hydration status: euvolemic    PONV: none          No complications documented.

## 2021-04-29 NOTE — ANESTHESIA PROCEDURE NOTES
Airway    Date/Time: 4/28/2021 8:45 PM  Performed by: Shayne Cruz M.D.  Authorized by: Shayne Cruz M.D.     Location:  OR  Urgency:  Elective  Indications for Airway Management:  Anesthesia      Spontaneous Ventilation: absent    Sedation Level:  Deep  Preoxygenated: Yes    Patient Position:  Sniffing  Final Airway Type:  Endotracheal airway  Final Endotracheal Airway:  ETT  Cuffed: Yes    Technique Used for Successful ETT Placement:  Direct laryngoscopy    Insertion Site:  Oral  Blade Type:  Mark  Laryngoscope Blade/Videolaryngoscope Blade Size:  4  ETT Size (mm):  8.0  Measured from:  Teeth  ETT to Teeth (cm):  24  Placement Verified by: auscultation and capnometry    Cormack-Lehane Classification:  Grade I - full view of glottis  Number of Attempts at Approach:  1

## 2021-04-29 NOTE — DISCHARGE SUMMARY
Discharge Summary    CHIEF COMPLAINT ON ADMISSION  Chief Complaint   Patient presents with   • Abdominal Pain     since this AM. intermittent mid epigastric abd pain, non-radiating.    • Vomiting       Reason for Admission  Abd Pain     Admission Date  4/28/2021    CODE STATUS  Full Code    HPI & HOSPITAL COURSE  This is a 25 y.o. male here with RUQ pain and found to have mild acute cholecystitis . General surgery was consulted by ERP who recommended laparoscopic cholecystectomy. He was take to OR with Dr. Malik for cholecystectomy and returned to the CDU for post-op monitoring. His diet was advanced and he tolerated well . Pain was controlled. Bowel sounds were active and he reported flatus. No leukocytosis and afebrile. Therefore, he was discharged with instructions to follow-up with General Surgery in 1-2 weeks.     Therefore, he is discharged in good and stable condition to home with close outpatient follow-up.    The patient recovered much more quickly than anticipated on admission.    Discharge Date  4/29/2021    FOLLOW UP ITEMS POST DISCHARGE  General Surgery in 7-10 days    DISCHARGE DIAGNOSES  Active Problems:    Postoperative pain POA: Unknown  Resolved Problems:    * No resolved hospital problems. *      FOLLOW UP  No future appointments.  Tarik Malik M.D.  [address] [place] [id number]  [phone]    Schedule an appointment as soon as possible for a visit in 2 weeks  Telehealth if desired      MEDICATIONS ON DISCHARGE     Medication List      START taking these medications      Instructions   acetaminophen 325 MG Tabs  Commonly known as: Tylenol   Take 2 Tablets by mouth every 6 hours. Alternate w/ ibuprofen to take a medication every 3 hrs, take scheduled for 3 days post-op then PRN  Dose: 650 mg     docusate sodium 100 MG Caps  Commonly known as: COLACE   Take 1 capsule by mouth 2 times a day. While taking narcotics  Dose: 100 mg     ibuprofen 600 MG Tabs  Commonly known as: MOTRIN    Take 1 tablet by mouth every 6 hours. Alternate w/ tylenol to take a medication every 3 hrs, take scheduled for 3 days post-op then PRN  Dose: 600 mg     oxyCODONE immediate-release 5 MG Tabs  Commonly known as: ROXICODONE   Take 1 tablet by mouth every four hours as needed for Severe Pain for up to 3 days.  Dose: 5 mg            Allergies  Allergies   Allergen Reactions   • Horse Allergy        DIET  No orders of the defined types were placed in this encounter.      ACTIVITY  As tolerated.  Weight bearing as tolerated    CONSULTATIONS  General Surgery, Dr. Malik    PROCEDURES  4/28/2021: Lap cholecystectomy     LABORATORY  Lab Results   Component Value Date    SODIUM 140 04/28/2021    POTASSIUM 3.8 04/28/2021    CHLORIDE 105 04/28/2021    CO2 20 04/28/2021    GLUCOSE 115 (H) 04/28/2021    BUN 18 04/28/2021    CREATININE 0.67 04/28/2021        Lab Results   Component Value Date    WBC 4.9 04/29/2021    HEMOGLOBIN 13.8 (L) 04/29/2021    HEMATOCRIT 41.7 (L) 04/29/2021    PLATELETCT 211 04/29/2021

## 2021-04-29 NOTE — OP REPORT
Operative Report    Date: 4/28/2021    Surgeon: Tarik Malik M.D.     Assistant: YUNIEL Pool    Pre-operative Diagnosis: Acute Cholecystitis    Post-operative Diagnosis: same    ASA Classification: III.E    Procedure: Laparoscopic cholecystectomy    Indications: This is a 25 y.o. male who presented with symptoms of acute cholecystitis.      The indications for a surgical assistant in this surgery were indicated due to complexity of the procedure. Their role included aiding in incision, retraction, holding devices including camera for laparoscopic procedure, and closure of the wound.      Wound Classification: Class II, II, Clean Contaminated..    Findings: acute cholecystitis    Procedure in detail: The patient was seen and examined in the preoperative holding area.  The risks benefits and alternatives of the procedure were discussed with the patient who wished to proceed with the procedure as described.  The patient was transferred to the operating room placed in supine position and all pressure points were properly padded.  General endotracheal anesthesia was induced and preoperative antibiotics were given per SCIP protocol.  Patient's abdomen was prepped with ChloraPrep and draped in the normal sterile fashion.  A timeout was performed confirming correct patient, correct procedure, and that all necessary equipment was in the room.      After infiltration of local anesthetic, an incision was made in the supraumbilical position.  A combination of blunt and electrocautery dissection was used down to the fascia.  The umbilical stalk was grasped elevated and the fascia was sharply incised.  A figure-of-eight 0 vicryl suture was placed across the fascial opening.  The Senior port was introduced and pneumoperitoneum was achieved and maintained at 15 mmHg carbon dioxide throughout the entirety of the case.   The 5 mm camera was introduced and the abdomen was inspected and no underlying trauma was identified from  abdominal entry. After infusing local anesthetic under direct visualization two 5 mm right upper quadrant and one 5 mm epigastric port were placed.    The gallbladder was identified in the right upper quadrant.  A combination of blunt and electrocautery dissection were used to dissect the overlaying tissue free from around the cystic duct and cystic artery.  Dissection was continued until the cystic duct and cystic artery free and there is nothing but liver parenchyma behind.  This confirmed the critical view of safety. The cystic duct and cystic artery were double clipped on the patient's side single clipped on the specimen side and sharply transected. The gallbladder was removed from the cystic plate using electrocautery.  I inspected the cystic plate and found it to be hemostatic.  The gallbladder was placed in a 10 mm Endo Catch bag through the supraumbilical port and removed.    All ports were removed with video assist, and were hemostatic. The previously placed vicryl suture were tied. All skin sites were closed with subcuticular Monocryl and Dermabond was placed over the wounds.    The patient was awakened from general anesthetic, and was taken to the recovery room in stable condition.    Sponge and needle counts were correct at the end of the case.     Specimen: gallbladder and content for permanent pathology    EBL: 75mL    Dispo: stable, extubated, to PACU    Tarik Malik M.D.  [facility]  [phone]

## 2021-04-29 NOTE — OR NURSING
2429 Report given to Cesar RN, Pt and vs Stable, Pain was 0 but when giving report pain increased to a 7/10. Informed floor RN will treat pain and hold longer in Pacu. Continued with report, Dressing CDI, Pt A&O x 4. Will continue to monitor pain. Pt on oxymask a 8 liters, O2 take 548.

## 2021-04-29 NOTE — ANESTHESIA TIME REPORT
Anesthesia Start and Stop Event Times     Date Time Event    4/28/2021 2028 Ready for Procedure     2041 Anesthesia Start     2145 Anesthesia Stop        Responsible Staff  04/28/21    Name Role Begin End    Shayne Cruz M.D. Anesth 2041 2145        Preop Diagnosis (Free Text):  Pre-op Diagnosis     acute cholecystitis        Preop Diagnosis (Codes):    Post op Diagnosis  Acute cholecystitis      Premium Reason  A. 3PM - 7AM    Comments:

## 2021-04-29 NOTE — DISCHARGE INSTRUCTIONS
Discharge Instructions    Discharged to home by car with relative. Discharged via wheelchair, hospital escort: Yes.  Special equipment needed: Not Applicable    Be sure to schedule a follow-up appointment with your primary care doctor or any specialists as instructed.     Discharge Plan:   Diet Plan: Discussed  Activity Level: Discussed  Confirmed Follow up Appointment: Patient to Call and Schedule Appointment  Confirmed Symptoms Management: Discussed  Medication Reconciliation Updated: Yes    I understand that a diet low in cholesterol, fat, and sodium is recommended for good health. Unless I have been given specific instructions below for another diet, I accept this instruction as my diet prescription.   Other diet:     Special Instructions: None    · Is patient discharged on Warfarin / Coumadin?   No     Depression / Suicide Risk    As you are discharged from this [facility] facility, it is important to learn how to keep safe from harming yourself.    Recognize the warning signs:  · Abrupt changes in personality, positive or negative- including increase in energy   · Giving away possessions  · Change in eating patterns- significant weight changes-  positive or negative  · Change in sleeping patterns- unable to sleep or sleeping all the time   · Unwillingness or inability to communicate  · Depression  · Unusual sadness, discouragement and loneliness  · Talk of wanting to die  · Neglect of personal appearance   · Rebelliousness- reckless behavior  · Withdrawal from people/activities they love  · Confusion- inability to concentrate     If you or a loved one observes any of these behaviors or has concerns about self-harm, here's what you can do:  · Talk about it- your feelings and reasons for harming yourself  · Remove any means that you might use to hurt yourself (examples: pills, rope, extension cords, firearm)  · Get professional help from the community (Mental Health, Substance Abuse, psychological  counseling)  · Do not be alone:Call your Safe Contact- someone whom you trust who will be there for you.  · Call your local CRISIS HOTLINE 349-6003 or 295-538-0073  · Call your local Children's Mobile Crisis Response Team [place] (676) 115-1653 or www.Enevo  · Call the toll free National Suicide Prevention Hotlines   · National Suicide Prevention Lifeline 278-176-XVAR (2146)  · [facility] Hope Line Network 800-SUICIDE (015-9514)    Laparoscopic Cholecystectomy D/C instructions:    DIET: Upon discharge from the hospital you may resume your normal preoperative diet. Depending on how you are feeling and whether you have nausea or not, you may wish to stay with a bland diet for the first few days. However, you can advance this as quickly as you feel ready.    ACTIVITIES: After discharge from the hospital, you may resume full routine activities. However, there should be no heavy lifting (greater than 15 pounds) and no strenuous activities until after your follow-up visit. Otherwise, routine activities of daily living are acceptable.    DRIVING: You may drive whenever you are off pain medications and are able to perform the activities needed to drive, i.e. turning, bending, twisting, etc.    BATHING: You may get the wound wet at any time after leaving the hospital. You may shower, but do not submerge in a bath for at least a week. Dressings may come off after 48 hours.    BOWEL FUNCTION: A few patients, after this operation, will develop either frequent or loose stools after meals. This usually corrects itself after a few days, to a few weeks. If this occurs, do not worry; it is not unusual and will resolve. Much more common than loose stools, is constipation. The combination of pain medication and decreased activity level can cause constipation in otherwise normal patients. If you feel this is occurring, take a laxative (Milk of Magnesia, Ex-Lax, Senokot, etc.) until the problem has resolved.    PAIN  MEDICATION: You will be given a prescription for pain medication at discharge. Please take these as directed. It is important to remember not to take medications on an empty stomach as this may cause nausea.    CALL IF YOU HAVE: (1) Fevers to more than 1010 F, (2) Unusual chest or leg pain, (3) Drainage or fluid from incision that may be foul smelling, increased tenderness or soreness at the wound or the wound edges are no longer together, redness or swelling at the incision site. Please do not hesitate to call with any other questions.     APPOINTMENT: Contact our office at [phone] for a follow-up appointment in 1 to 2 weeks following your procedure.    If you have any additional questions, please do not hesitate to call the office.    Office address:   [address], [address] [place] [address]

## 2021-04-29 NOTE — PROGRESS NOTES
Pt received from transport from PACU. VSS. Pt oriented to room. Educated on use of the call light. Pt demonstrated use of the call light. Discussed POC. All questions answered.

## 2021-04-29 NOTE — ED NOTES
Pt denies taking any OTC or prescription medications  Allergies reviewed - NKDA  No ABX in last 14 days

## 2021-04-29 NOTE — CARE PLAN
Problem: Communication  Goal: The ability to communicate needs accurately and effectively will improve  Outcome: PROGRESSING AS EXPECTED   Pt communicating needs appropriately. Oriented to the call light and to call for assistance.    Problem: Safety  Goal: Will remain free from injury  Outcome: PROGRESSING AS EXPECTED   Safety precautions reviewed with pt, pt verbalized understanding and denies questions.  Pt demonstrated ability to use call light for assistance.   Problem: Pain Management  Goal: Pain level will decrease to patient's comfort goal  Outcome: PROGRESSING AS EXPECTED   Q4 pain assessments in place. Pt educated on pain scale. RN aware of pt's goal pain. PRN medication orders followed.

## 2021-04-29 NOTE — H&P
"    CHIEF COMPLAINT: right upper quadrant pain     HISTORY OF PRESENT ILLNESS:  The patient is a 25 year-old  man who presents to the Emergency Department a 1- day history of moderate and localized right upper quadrant  abdominal pain. The pain is associated with nausea, vomiting and diarrhea. He reports intermittent precipitation and exacerbation of symptoms with ingestion of all types of foods. He admits a family history of gallstones. The patient denies any recent or intercurrent illness. The patient denies any recent or intercurrent illness. The patient denies any history of previous abdominal surgery.    PAST MEDICAL HISTORY:      PAST SURGICAL HISTORY:  has a past surgical history that includes irrigation & debridement general (2013).     ALLERGIES:   Allergies   Allergen Reactions   • Horse Allergy         CURRENT MEDICATIONS:   Home Medications     Reviewed by Sirisha Palma (Pharmacy Intern) on 21 at 1916  Med List Status: Complete   Medication Last Dose Status        Patient Rohit Taking any Medications                       FAMILY HISTORY: History reviewed. No pertinent family history.    SOCIAL HISTORY:   Social History     Tobacco Use   • Smoking status: Former Smoker     Quit date: 2015     Years since quittin.4   • Smokeless tobacco: Current User     Types: Chew   Substance and Sexual Activity   • Alcohol use: Yes     Comment: occasionally    • Drug use: Not Currently     Comment: cbd oil   • Sexual activity: Not on file       REVIEW OF SYSTEMS: Comprehensive review of systems is negative with the exception of the aforementioned HPI, PMH, and PSH bullets in accordance with CMS guidelines.     PHYSICAL EXAMINATION:   CONSTITUTIONAL:     Vital Signs: BP (!) 178/116   Pulse (!) 105   Temp 36.8 °C (98.3 °F) (Temporal)   Resp 16   Ht 1.803 m (5' 11\")   Wt (!) 153 kg (337 lb 15.4 oz)   SpO2 97%    General Appearance: appears stated age.  HEAD AND NECK: The pupils are equal, " round, and reactive to light bilaterally. The extraocular muscles are intact bilaterally.. The sclera are anicteric. Nares and oropharynx are clear.   NECK: Supple. No adenopathy.  RESPIRATORY:   Inspection: Unlabored respirations, no intercostal retractions, paradoxical motion, or accessory muscle use.   Palpation:  The chest is nontender.    Auscultation: normal.  CARDIOVASCULAR:   Inspection: The skin is warm and dry.  Auscultation: Regular rate and rhythm.   Peripheral Pulses: Normal.   ABDOMEN:   Inspection: Abdominal inspection reveals no abrasions, contusions, lacerations or penetrating wounds.   Palpation: Palpation is remarkable for moderate tenderness in the right upper quadrant  region.   EXTREMITIES: Examination of the upper and lower extremities demonstrates no cyanosis edema or clubbing.  NEUROLOGIC: Alert & oriented x 3, Normal motor function, Normal sensory function, No focal deficits noted.  PSYCHIATRIC:   does not appear depressed or anxious.    LABORATORY VALUES:   Recent Labs     04/28/21  1648   WBC 11.0*   RBC 5.75   HEMOGLOBIN 16.3   HEMATOCRIT 48.3   MCV 84.0   MCH 28.3   MCHC 33.7   RDW 40.7   PLATELETCT 291   MPV 9.2     Recent Labs     04/28/21  1648   SODIUM 140   POTASSIUM 3.8   CHLORIDE 105   CO2 20   GLUCOSE 115*   BUN 18   CREATININE 0.67   CALCIUM 9.5     Recent Labs     04/28/21  1648   ASTSGOT 39   ALTSGPT 80*   TBILIRUBIN 0.8   ALKPHOSPHAT 84   GLOBULIN 3.1            IMAGING:   US-RUQ   Final Result      1.  Limited exam due to body habitus.   2.  Enlarged echogenic liver suggesting fatty infiltration.   3.  Sludge present in the gallbladder.   4.  Pain reported with scanning over the gallbladder.  No other evidence for acute cholecystitis.   5.  Common bile duct is not well-visualized, as is the pancreas and IVC.              IMPRESSION AND PLAN:   [unfilled]    [employer] [employer] [facility] [employer] Guidelines for Acute Cholecystitis 2018  ACS NSQIP Surgical Risk Calculator    The patient has Grade I  (mild) acute cholecystitis. Plan: laparoscopic cholecystectomy.    The patient will be taken to the operating room for laparoscopic cholecystectomy. The surgical conduct was discussed in detail. Potential complications including, but not limited to, infection, bleeding, damage to adjacent structures, bile duct injury, need to convert to an open procedure, and anesthetic complications were discussed. Operative consent signed.  Admission SARS-CoV-2 testing negative. LOW RISK patient. Repeat SARS-CoV-2 testing not indicated. Isolation precautions de-escalated.     ____________________________________     Tarik Malik M.D.    DD: 4/28/2021  7:29 PM

## 2021-04-29 NOTE — ED PROVIDER NOTES
ED Provider Note    Scribed for Maxine Blanchard M.D. by Stephanie Downs. 2021  5:10 PM    Primary care provider: Pcp Pt States None  Means of arrival: Walk in  History obtained from: patient   History limited by: none    CHIEF COMPLAINT  Chief Complaint   Patient presents with    Abdominal Pain     since this AM. intermittent mid epigastric abd pain, non-radiating.     Vomiting       HPI  Mitchel Jace Sandy is a 25 y.o. male who presents to the Emergency Department abdominal pain onset this morning. He has associated vomiting and diarrhea. He located his pain to the epigastric region. Patients pain is temporarily alleviated after emesis. Additionally the patient states he has had some hematochezia for the last 6 months, but has not noticed it recently. Patient recently traveled to [place] and denies any known COVID exposures. He denies any pertinent past medical history, daily medications, or abdominal surgeries. Endorses chewing tobacco and rare alcohol consumption.    REVIEW OF SYSTEMS  HEENT:  No ear pain, congestion, or sore throat   EYES: no discharge, redness, or vision changes  CARDIAC: no chest pain, no palpitations    PULMONARY: no dyspnea, cough, or congestion   GI: + vomiting, diarrhea, and abdominal pain   : no dysuria, back pain, or hematuria   Neuro: no weakness, numbness, aphasia, or headache  Musculoskeletal: no swelling, deformity, pain, or joint swelling  Endocrine: no fevers, sweating, or weight loss   SKIN: no rash, erythema, or contusions     See history of present illness. All other systems are negative. C.    PAST MEDICAL HISTORY   None pertinent    SURGICAL HISTORY   has a past surgical history that includes irrigation & debridement general (2013).    SOCIAL HISTORY  Social History     Tobacco Use    Smoking status: Former Smoker     Quit date: 2015     Years since quittin.4    Smokeless tobacco: Current User     Types: Chew   Substance Use Topics    Alcohol use:  "Yes     Comment: occasionally     Drug use: Not Currently     Comment: cbd oil      Social History     Substance and Sexual Activity   Drug Use Not Currently    Comment: cbd oil       FAMILY HISTORY  History reviewed. No pertinent family history.    CURRENT MEDICATIONS  Home Medications       Reviewed by Sirisha Palma (Pharmacy Intern) on 04/28/21 at 1916  Med List Status: Complete     Medication Last Dose Status        Patient Rohit Taking any Medications                           ALLERGIES  Allergies   Allergen Reactions    Horse Allergy        PHYSICAL EXAM  VITAL SIGNS: BP (!) 178/116   Pulse (!) 105   Temp 36.8 °C (98.3 °F) (Temporal)   Resp 16   Ht 1.803 m (5' 11\")   Wt (!) 153 kg (337 lb 15.4 oz)   SpO2 97%   BMI 47.14 kg/m²     Constitutional: Well developed, Well nourished, BMI elevated above normal range, No acute distress, Non-toxic appearance.   HEENT: Normocephalic, Atraumatic,  external ears normal, pharynx pink, Dry mucous, No rhinorrhea or mucosal edema  Eyes: PERRL, EOMI, Conjunctiva normal, No discharge.   Neck: Normal range of motion, No tenderness, Supple, No stridor.   Lymphatic: No lymphadenopathy    Cardiovascular: Tachycardic, Regular Rhythm, No murmurs,  rubs, or gallops.   Thorax & Lungs: Lungs clear to auscultation bilaterally, No respiratory distress, No wheezes, rhales or rhonchi, No chest wall tenderness.   Abdomen: Bowel sounds normal, Soft, right upper quadrant tenderness to palpation, non distended,  No pulsatile masses., no rebound guarding or peritoneal signs.   Skin: Warm, Dry, No erythema, No rash,   Back:  No CVA tenderness,  No spinal tenderness, bony crepitance, step offs, or instability.   Neurologic: Alert & oriented x 3, No focal deficits noted.   Extremities: Equal, intact distal pulses, No cyanosis, clubbing or edema,  No tenderness.   Musculoskeletal: Good range of motion in all major joints. No tenderness to palpation or major deformities noted. "     DIAGNOSTIC STUDIES / PROCEDURES    LABS  Results for orders placed or performed during the hospital encounter of 04/28/21   CBC WITH DIFFERENTIAL   Result Value Ref Range    WBC 11.0 (H) 4.8 - 10.8 K/uL    RBC 5.75 4.70 - 6.10 M/uL    Hemoglobin 16.3 14.0 - 18.0 g/dL    Hematocrit 48.3 42.0 - 52.0 %    MCV 84.0 81.4 - 97.8 fL    MCH 28.3 27.0 - 33.0 pg    MCHC 33.7 33.7 - 35.3 g/dL    RDW 40.7 35.9 - 50.0 fL    Platelet Count 291 164 - 446 K/uL    MPV 9.2 9.0 - 12.9 fL    Neutrophils-Polys 90.10 (H) 44.00 - 72.00 %    Lymphocytes 4.10 (L) 22.00 - 41.00 %    Monocytes 3.70 0.00 - 13.40 %    Eosinophils 1.40 0.00 - 6.90 %    Basophils 0.20 0.00 - 1.80 %    Immature Granulocytes 0.50 0.00 - 0.90 %    Nucleated RBC 0.00 /100 WBC    Neutrophils (Absolute) 9.91 (H) 1.82 - 7.42 K/uL    Lymphs (Absolute) 0.45 (L) 1.00 - 4.80 K/uL    Monos (Absolute) 0.41 0.00 - 0.85 K/uL    Eos (Absolute) 0.15 0.00 - 0.51 K/uL    Baso (Absolute) 0.02 0.00 - 0.12 K/uL    Immature Granulocytes (abs) 0.05 0.00 - 0.11 K/uL    NRBC (Absolute) 0.00 K/uL   COMP METABOLIC PANEL   Result Value Ref Range    Sodium 140 135 - 145 mmol/L    Potassium 3.8 3.6 - 5.5 mmol/L    Chloride 105 96 - 112 mmol/L    Co2 20 20 - 33 mmol/L    Anion Gap 15.0 7.0 - 16.0    Glucose 115 (H) 65 - 99 mg/dL    Bun 18 8 - 22 mg/dL    Creatinine 0.67 0.50 - 1.40 mg/dL    Calcium 9.5 8.5 - 10.5 mg/dL    AST(SGOT) 39 12 - 45 U/L    ALT(SGPT) 80 (H) 2 - 50 U/L    Alkaline Phosphatase 84 30 - 99 U/L    Total Bilirubin 0.8 0.1 - 1.5 mg/dL    Albumin 4.8 3.2 - 4.9 g/dL    Total Protein 7.9 6.0 - 8.2 g/dL    Globulin 3.1 1.9 - 3.5 g/dL    A-G Ratio 1.5 g/dL   LIPASE   Result Value Ref Range    Lipase 24 11 - 82 U/L   ESTIMATED GFR   Result Value Ref Range    GFR If African American >60 >60 mL/min/1.73 m 2    GFR If Non African American >60 >60 mL/min/1.73 m 2   EKG   Result Value Ref Range    Report       [facility] Emergency Dept.    Test Date:   2021  Pt Name:    VELASQUEZ CAIN       Department: ER  MRN:        [id number]                      Room:       [facility]  Gender:     Male                         Technician: [id number]  :        1995                   Requested By:NASIR CHEATHAM  Order #:    [id number]                    Reading MD: NASIR CHEATHAM MD    Measurements  Intervals                                Axis  Rate:       114                          P:          33  VT:         182                          QRS:        45  QRSD:       103                          T:          20  QT:         322  QTc:        444    Interpretive Statements  Sinus tachycardia  Borderline T wave abnormalities  No previous ECG available for comparison  Electronically Signed On 2021 19:00:45 PDT by NASIR CHEATHAM MD       All labs reviewed by me.    EKG  12 lead EKG; Interpreted by emergency department physician as above    RADIOLOGY  US-RUQ   Final Result      1.  Limited exam due to body habitus.   2.  Enlarged echogenic liver suggesting fatty infiltration.   3.  Sludge present in the gallbladder.   4.  Pain reported with scanning over the gallbladder.  No other evidence for acute cholecystitis.   5.  Common bile duct is not well-visualized, as is the pancreas and IVC.           The radiologist's interpretation of all radiological studies have been reviewed by me.    COURSE & MEDICAL DECISION MAKING  Nursing notes, VS, PMSFHx reviewed in chart.    5:10 PM Patient seen and examined at bedside. I discussed that we will obtain labs and imaging to further evaluate for a cause of his symptoms. I informed the patient that we will start with an US of his gallbladder as this is where is abdomen is most tender, and determine a further plan of care when we receive the results. Patient will be treated with Morphine 4 mg and Zofran 4 mg. Intravenous fluids administered for tachycardia and dehydration. Ordered US RUQ, CBC w/ diff, CMP, lipase, UA, and EKG to evaluate  his symptoms. The differential diagnoses include but are not limited to: Cholelithiasis, cholecystitis, pancreatitis, gastroenteritis, diverticulitis, dehydration    6:48 PM Reviewed patients US. Reexamined abdomen and the patient continues to have tenderness in his right upper quadrant. Paged Dr. Malik.    7:05 PM I discussed the patient's case and the above findings with Dr. Malik (surgery) who will take the patient to the operating room. Ordered COVID/SARS    HYDRATION: Based on the patient's presentation of Dehydration and Tachycardia the patient was given IV fluids. IV Hydration was used because oral hydration was not adequate alone. Upon recheck following hydration, the patient was improved.    DISPOSITION:  Patient will be hospitalized by Dr. Duncan in guarded condition.     FINAL IMPRESSION  1. Cholecystitis          Stephanie CUNNINGHAM (Scribrobert), am scribing for, and in the presence of, Maxine Blanchard M.D..    Electronically signed by: Stephanie Downs (Fabiánibe), 4/28/2021    IaMxine M.D. personally performed the services described in this documentation, as scribed by Stephanie Downs in my presence, and it is both accurate and complete. C    The note accurately reflects work and decisions made by me.  Maxine Blanchard M.D.  4/28/2021  10:22 PM

## 2021-08-09 ENCOUNTER — OFFICE VISIT (OUTPATIENT)
Dept: URGENT CARE | Facility: PHYSICIAN GROUP | Age: 26
End: 2021-08-09
Payer: COMMERCIAL

## 2021-08-09 ENCOUNTER — HOSPITAL ENCOUNTER (OUTPATIENT)
Dept: RADIOLOGY | Facility: MEDICAL CENTER | Age: 26
End: 2021-08-09
Attending: FAMILY MEDICINE
Payer: COMMERCIAL

## 2021-08-09 VITALS
SYSTOLIC BLOOD PRESSURE: 138 MMHG | HEART RATE: 86 BPM | TEMPERATURE: 97.6 F | WEIGHT: 250 LBS | OXYGEN SATURATION: 99 % | HEIGHT: 71 IN | BODY MASS INDEX: 35 KG/M2 | DIASTOLIC BLOOD PRESSURE: 72 MMHG | RESPIRATION RATE: 16 BRPM

## 2021-08-09 DIAGNOSIS — S83.8X2A SPRAIN OF OTHER LIGAMENT OF LEFT KNEE, INITIAL ENCOUNTER: ICD-10-CM

## 2021-08-09 DIAGNOSIS — S89.92XA INJURY OF LEFT KNEE, INITIAL ENCOUNTER: ICD-10-CM

## 2021-08-09 PROCEDURE — 99213 OFFICE O/P EST LOW 20 MIN: CPT | Performed by: FAMILY MEDICINE

## 2021-08-09 PROCEDURE — 73564 X-RAY EXAM KNEE 4 OR MORE: CPT | Mod: LT

## 2021-08-09 ASSESSMENT — ENCOUNTER SYMPTOMS
COUGH: 0
FEVER: 0
VOMITING: 0
SORE THROAT: 0

## 2021-08-09 ASSESSMENT — FIBROSIS 4 INDEX: FIB4 SCORE: 0.56

## 2021-08-09 NOTE — PROGRESS NOTES
"Subjective:     Mitchel Sandy is a 26 y.o. male who presents for Knee Injury (Pt got up from the couch and twisted L knee and it popped and the knee cap is in a lot of pain )    HPI  Pt presents for evaluation of an acute problem  Injury 1 week ago   Patient with a left knee injury after standing from the couch and twisting  Felt a pop  Pain is in the anterior and medial knee   Pain is worse with ambulation   Pain is better when resting   No associated numbness or tingling   No radiation of pain   Having difficulties flexing past 90 degrees, but can fully extend     Review of Systems   Constitutional: Negative for fever.   HENT: Negative for sore throat.    Respiratory: Negative for cough.    Gastrointestinal: Negative for vomiting.   Skin: Negative for rash.     PMH:  has no past medical history of Asthma.  MEDS: No current outpatient medications on file.  ALLERGIES:   Allergies   Allergen Reactions   • Horse Allergy      SURGHX:   Past Surgical History:   Procedure Laterality Date   • SHARON BY LAPAROSCOPY  4/28/2021    Procedure: CHOLECYSTECTOMY, LAPAROSCOPIC;  Surgeon: Tarik Malik M.D.;  Location: SURGERY [facility];  Service: General   • IRRIGATION & DEBRIDEMENT GENERAL  5/21/2013    Performed by Dwight Pool M.D. at SURGERY SAME DAY [facility] ORS     SOCHX:  reports that he quit smoking about 5 years ago. His smokeless tobacco use includes chew. He reports current alcohol use. He reports previous drug use.     Objective:   /72   Pulse 86   Temp 36.4 °C (97.6 °F) (Temporal)   Resp 16   Ht 1.803 m (5' 11\")   Wt 113 kg (250 lb)   SpO2 99%   BMI 34.87 kg/m²     Physical Exam  Constitutional:       General: He is not in acute distress.     Appearance: He is well-developed. He is not diaphoretic.   HENT:      Head: Normocephalic and atraumatic.   Pulmonary:      Effort: Pulmonary effort is normal.   Neurological:      Mental Status: He is alert.     Left knee  Appearance - No " bruising, erythema, or deformity appreciated  Palpation - +TTP along the pes anserine and medial knee   ROM - FROM without crepitus  Strength - 5/5 throughout  Neuro - Sensation equal and intact bilaterally  Special testing - No laxity or pain with varus/valgus stress, neg posterior drawer, Lachman's without solid endpoint, neg Home's, neg patellar apprehension test    Assessment/Plan:   Assessment    1. Injury of left knee, initial encounter  - DX-KNEE COMPLETE 4+ LEFT; Future    2. Sprain of other ligament of left knee, initial encounter    Patient with left knee sprain.  X-ray does not show fracture.  Advised to use knee brace, perform gentle range of motion exercises, and keep off his knee as much as he can the next few days.  If he is making great progress, no further follow-up needed.  If he is not making great progress, he will follow-up with his PCP to discuss possible MRI.

## 2022-09-22 NOTE — PROGRESS NOTES
2 RN Skin Check    2 RN skin check complete with FABIOLA Joyner.   Devices in place: SCDs, oxymask.  Skin assessed under devices: yes.  Confirmed pressure ulcers found on: n./a.  New potential pressure ulcers noted on n/a. Wound consult placed N/A.  The following interventions in place Pillows.    BL ears, elbows, heels, sacrum pink and blanching.   Lap sites x4 to abdomen. CDI.    Pt wife informed of message below and verbalized understanding with no questions.

## 2022-10-27 ENCOUNTER — APPOINTMENT (OUTPATIENT)
Dept: RADIOLOGY | Facility: MEDICAL CENTER | Age: 27
End: 2022-10-27
Attending: EMERGENCY MEDICINE
Payer: COMMERCIAL

## 2022-10-27 ENCOUNTER — HOSPITAL ENCOUNTER (EMERGENCY)
Facility: MEDICAL CENTER | Age: 27
End: 2022-10-27
Attending: EMERGENCY MEDICINE
Payer: COMMERCIAL

## 2022-10-27 VITALS
HEIGHT: 71 IN | HEART RATE: 87 BPM | DIASTOLIC BLOOD PRESSURE: 87 MMHG | BODY MASS INDEX: 44.1 KG/M2 | WEIGHT: 315 LBS | TEMPERATURE: 98 F | RESPIRATION RATE: 18 BRPM | OXYGEN SATURATION: 99 % | SYSTOLIC BLOOD PRESSURE: 154 MMHG

## 2022-10-27 DIAGNOSIS — N20.0 KIDNEY STONE: Primary | ICD-10-CM

## 2022-10-27 DIAGNOSIS — R11.2 NAUSEA AND VOMITING, UNSPECIFIED VOMITING TYPE: ICD-10-CM

## 2022-10-27 DIAGNOSIS — K59.00 CONSTIPATION, UNSPECIFIED CONSTIPATION TYPE: ICD-10-CM

## 2022-10-27 LAB
ALBUMIN SERPL BCP-MCNC: 4.7 G/DL (ref 3.2–4.9)
ALBUMIN/GLOB SERPL: 1.7 G/DL
ALP SERPL-CCNC: 91 U/L (ref 30–99)
ALT SERPL-CCNC: 54 U/L (ref 2–50)
ANION GAP SERPL CALC-SCNC: 15 MMOL/L (ref 7–16)
APPEARANCE UR: CLEAR
AST SERPL-CCNC: 27 U/L (ref 12–45)
BACTERIA #/AREA URNS HPF: ABNORMAL /HPF
BASOPHILS # BLD AUTO: 0.4 % (ref 0–1.8)
BASOPHILS # BLD: 0.03 K/UL (ref 0–0.12)
BILIRUB SERPL-MCNC: 0.4 MG/DL (ref 0.1–1.5)
BILIRUB UR QL STRIP.AUTO: NEGATIVE
BUN SERPL-MCNC: 17 MG/DL (ref 8–22)
CALCIUM SERPL-MCNC: 9.4 MG/DL (ref 8.4–10.2)
CHLORIDE SERPL-SCNC: 102 MMOL/L (ref 96–112)
CO2 SERPL-SCNC: 20 MMOL/L (ref 20–33)
COLOR UR: YELLOW
CREAT SERPL-MCNC: 0.89 MG/DL (ref 0.5–1.4)
EOSINOPHIL # BLD AUTO: 0.06 K/UL (ref 0–0.51)
EOSINOPHIL NFR BLD: 0.8 % (ref 0–6.9)
EPI CELLS #/AREA URNS HPF: ABNORMAL /HPF
ERYTHROCYTE [DISTWIDTH] IN BLOOD BY AUTOMATED COUNT: 39.4 FL (ref 35.9–50)
GFR SERPLBLD CREATININE-BSD FMLA CKD-EPI: 119 ML/MIN/1.73 M 2
GLOBULIN SER CALC-MCNC: 2.8 G/DL (ref 1.9–3.5)
GLUCOSE SERPL-MCNC: 149 MG/DL (ref 65–99)
GLUCOSE UR STRIP.AUTO-MCNC: NEGATIVE MG/DL
HCT VFR BLD AUTO: 47 % (ref 42–52)
HGB BLD-MCNC: 16.2 G/DL (ref 14–18)
IMM GRANULOCYTES # BLD AUTO: 0.03 K/UL (ref 0–0.11)
IMM GRANULOCYTES NFR BLD AUTO: 0.4 % (ref 0–0.9)
KETONES UR STRIP.AUTO-MCNC: ABNORMAL MG/DL
LEUKOCYTE ESTERASE UR QL STRIP.AUTO: NEGATIVE
LYMPHOCYTES # BLD AUTO: 1.05 K/UL (ref 1–4.8)
LYMPHOCYTES NFR BLD: 14.6 % (ref 22–41)
MCH RBC QN AUTO: 28.6 PG (ref 27–33)
MCHC RBC AUTO-ENTMCNC: 34.5 G/DL (ref 33.7–35.3)
MCV RBC AUTO: 83 FL (ref 81.4–97.8)
MICRO URNS: ABNORMAL
MONOCYTES # BLD AUTO: 0.35 K/UL (ref 0–0.85)
MONOCYTES NFR BLD AUTO: 4.9 % (ref 0–13.4)
MUCOUS THREADS #/AREA URNS HPF: ABNORMAL /HPF
NEUTROPHILS # BLD AUTO: 5.67 K/UL (ref 1.82–7.42)
NEUTROPHILS NFR BLD: 78.9 % (ref 44–72)
NITRITE UR QL STRIP.AUTO: NEGATIVE
NRBC # BLD AUTO: 0 K/UL
NRBC BLD-RTO: 0 /100 WBC
PH UR STRIP.AUTO: 6 [PH] (ref 5–8)
PLATELET # BLD AUTO: 273 K/UL (ref 164–446)
PMV BLD AUTO: 8.8 FL (ref 9–12.9)
POTASSIUM SERPL-SCNC: 3.9 MMOL/L (ref 3.6–5.5)
PROT SERPL-MCNC: 7.5 G/DL (ref 6–8.2)
PROT UR QL STRIP: NEGATIVE MG/DL
RBC # BLD AUTO: 5.66 M/UL (ref 4.7–6.1)
RBC # URNS HPF: ABNORMAL /HPF
RBC UR QL AUTO: ABNORMAL
SODIUM SERPL-SCNC: 137 MMOL/L (ref 135–145)
SP GR UR STRIP.AUTO: 1.02
TROPONIN T SERPL-MCNC: <6 NG/L (ref 6–19)
WBC # BLD AUTO: 7.2 K/UL (ref 4.8–10.8)
WBC #/AREA URNS HPF: ABNORMAL /HPF

## 2022-10-27 PROCEDURE — 36415 COLL VENOUS BLD VENIPUNCTURE: CPT

## 2022-10-27 PROCEDURE — 94760 N-INVAS EAR/PLS OXIMETRY 1: CPT

## 2022-10-27 PROCEDURE — 99285 EMERGENCY DEPT VISIT HI MDM: CPT

## 2022-10-27 PROCEDURE — 84484 ASSAY OF TROPONIN QUANT: CPT

## 2022-10-27 PROCEDURE — 81001 URINALYSIS AUTO W/SCOPE: CPT

## 2022-10-27 PROCEDURE — 700105 HCHG RX REV CODE 258: Performed by: EMERGENCY MEDICINE

## 2022-10-27 PROCEDURE — 80053 COMPREHEN METABOLIC PANEL: CPT

## 2022-10-27 PROCEDURE — 700102 HCHG RX REV CODE 250 W/ 637 OVERRIDE(OP): Performed by: EMERGENCY MEDICINE

## 2022-10-27 PROCEDURE — A9270 NON-COVERED ITEM OR SERVICE: HCPCS | Performed by: EMERGENCY MEDICINE

## 2022-10-27 PROCEDURE — 87086 URINE CULTURE/COLONY COUNT: CPT

## 2022-10-27 PROCEDURE — 85025 COMPLETE CBC W/AUTO DIFF WBC: CPT

## 2022-10-27 PROCEDURE — 74176 CT ABD & PELVIS W/O CONTRAST: CPT

## 2022-10-27 RX ORDER — IBUPROFEN 800 MG/1
800 TABLET ORAL EVERY 8 HOURS PRN
Qty: 20 TABLET | Refills: 0 | Status: SHIPPED | OUTPATIENT
Start: 2022-10-27 | End: 2022-10-30

## 2022-10-27 RX ORDER — TAMSULOSIN HYDROCHLORIDE 0.4 MG/1
0.4 CAPSULE ORAL ONCE
Status: COMPLETED | OUTPATIENT
Start: 2022-10-27 | End: 2022-10-27

## 2022-10-27 RX ORDER — HYDROCODONE BITARTRATE AND ACETAMINOPHEN 5; 325 MG/1; MG/1
1 TABLET ORAL EVERY 4 HOURS PRN
Qty: 12 TABLET | Refills: 0 | Status: SHIPPED | OUTPATIENT
Start: 2022-10-27 | End: 2022-10-30

## 2022-10-27 RX ORDER — ONDANSETRON 4 MG/1
4 TABLET, ORALLY DISINTEGRATING ORAL EVERY 6 HOURS PRN
Qty: 10 TABLET | Refills: 0 | Status: SHIPPED | OUTPATIENT
Start: 2022-10-27 | End: 2022-12-09

## 2022-10-27 RX ORDER — TAMSULOSIN HYDROCHLORIDE 0.4 MG/1
0.4 CAPSULE ORAL DAILY
Qty: 30 CAPSULE | Refills: 0 | Status: SHIPPED | OUTPATIENT
Start: 2022-10-27 | End: 2022-12-09

## 2022-10-27 RX ORDER — SODIUM CHLORIDE 9 MG/ML
1000 INJECTION, SOLUTION INTRAVENOUS ONCE
Status: COMPLETED | OUTPATIENT
Start: 2022-10-27 | End: 2022-10-27

## 2022-10-27 RX ADMIN — TAMSULOSIN HYDROCHLORIDE 0.4 MG: 0.4 CAPSULE ORAL at 04:15

## 2022-10-27 RX ADMIN — SODIUM CHLORIDE 1000 ML: 9 INJECTION, SOLUTION INTRAVENOUS at 04:10

## 2022-10-27 ASSESSMENT — FIBROSIS 4 INDEX: FIB4 SCORE: 0.58

## 2022-10-27 NOTE — ED TRIAGE NOTES
"27 yr old male to triage  Chief Complaint   Patient presents with    Abdominal Pain    N/V     Patient report of diffuse abdominal pain radiating to the right flank area at midnight with one episode of vomiting at 1 am.  Patient report he took \"1000mg of ibuprofen approximately 30 minutes ago and now has no pain\"  Denies dysuria or frequency. Last bowel movement 4 days.      BP (!) 170/123   Pulse 76   Temp 36.8 °C (98.3 °F) (Temporal)   Resp 18   Ht 1.803 m (5' 11\")   Wt (!) 162 kg (357 lb 12.9 oz)   SpO2 94%   BMI 49.90 kg/m²       "

## 2022-10-27 NOTE — DISCHARGE INSTRUCTIONS
As we discussed, the kidney stone should pass within the next 24 to 48 hours.  I have provided you pain medication called Lortab to take.  Do not drink alcohol with this or operate machinery.  I also sent Zofran which is a nausea medication.  I will also provide you a medication called Flomax which should help you try to pee out the stone.  If you do not have improvement after the first day or 2 or your pain is persisting, you can call the urologist name and number who I gave you above and make an appointment.  If you do develop a fever or persistent nausea vomiting and dehydration, please return to the ED.  Thank you for coming in today.

## 2022-10-27 NOTE — ED PROVIDER NOTES
"ED Provider Note  ED Provider Note    Scribed for Bhargav Arndt by Bhargav Arndt. 10/27/2022  2:49 AM    Primary care provider: Pcp Pt States None  Means of arrival: private vehicle   History obtained from: Patient  History limited by: None    CHIEF COMPLAINT  Chief Complaint   Patient presents with    Abdominal Pain    N/V     Patient report of diffuse abdominal pain radiating to the right flank area at midnight with one episode of vomiting at 1 am.  Patient report he took \"1000mg of ibuprofen approximately 30 minutes ago and now has no pain\"  Denies dysuria or frequency. Last bowel movement 4 days.      HPI  Mitchel Sandy is a 27 y.o. male who presents to the Emergency Department with sudden onset of nausea, vomiting, abdominal right-sided flank pain that started around midnight, 2 and half hours prior to arrival.  He took 1000 mg of Motrin just before arriving to the ED and the pain subsided about 20 minutes ago.  He states that the pain was so severe that he was in tears.  He denies ever having any similar episodes.  He has not had a bowel movement in 3 to 4 days which is not significantly abnormal for him as he usually was to 3 days without having bowel movements or some chronic constipation.  He denies any symptoms yesterday felt well when he went to bed.  No one else is sick.  He had normal dinner.  Denies fevers, chest pain, shortness of breath.  Upon urinating here in the ED he states that he did have some discomfort in his pelvic region with urination but denies any hematuria.  He does vape and use nicotine patches but no regular alcohol use, no drug use.  Quality: Sharp flank pain  Duration: 2 hours  Severity: Severe  Associated sx: Nausea and vomiting    REVIEW OF SYSTEMS  As above, all other systems reviewed and are negative.   See HPI for further details.     PAST MEDICAL HISTORY     SURGICAL HISTORY   has a past surgical history that includes irrigation & debridement " "general (5/21/2013) and mayank by laparoscopy (4/28/2021).  SOCIAL HISTORY  Social History     Tobacco Use    Smoking status: Every Day     Types: Cigarettes    Smokeless tobacco: Current     Types: Chew   Vaping Use    Vaping Use: Every day   Substance Use Topics    Alcohol use: Yes     Comment: occasionally     Drug use: Not Currently     Comment: cbd oil      Social History     Substance and Sexual Activity   Drug Use Not Currently    Comment: cbd oil     FAMILY HISTORY  History reviewed. No pertinent family history.  CURRENT MEDICATIONS  Home Medications       Reviewed by Nelly Ring R.N. (Registered Nurse) on 10/27/22 at 0244  Med List Status: Complete     Medication Last Dose Status        Patient Rohit Taking any Medications                         ALLERGIES  Allergies   Allergen Reactions    Horse Allergy        PHYSICAL EXAM    VITAL SIGNS:   Vitals:    10/27/22 0236 10/27/22 0243 10/27/22 0323   BP:  (!) 170/123 (!) 161/89   Pulse:  76 82   Resp:  18 18   Temp:  36.8 °C (98.3 °F)    TempSrc:  Temporal    SpO2:  94% 98%   Weight: (!) 162 kg (357 lb 12.9 oz)     Height: 1.803 m (5' 11\")         Vitals: My interpretation: Hypertension, not tachycardic, afebrile, not hypoxic    Reinterpretation of vitals: Improved    PE:   Constitutional: Well developed, Well nourished, No acute distress, Non-toxic appearance.   HENT: Normocephalic, Atraumatic, Bilateral external ears normal, Oropharynx is clear mucous membranes are moist. No oral exudates or nasal discharge.   Eyes: Pupils are equal round and reactive, EOMI, Conjunctiva normal, No discharge.   Neck: Normal range of motion, No tenderness, Supple, No stridor. No meningismus.  Lymphatic: No lymphadenopathy noted.   Cardiovascular: Regular rate and rhythm without murmur rub or gallop.  Thorax & Lungs: Clear breath sounds bilaterally without wheezes, rhonchi or rales. There is no chest wall tenderness.   Abdomen: Soft non-tender non-distended. There is no " rebound or guarding. No organomegaly is appreciated. Bowel sounds are normal.  Skin: Normal without rash.   Back: No CVA or spinal tenderness.   Extremities: Intact distal pulses, No edema, No tenderness, No cyanosis, No clubbing. Capillary refill is less than 2 seconds.  Musculoskeletal: Good range of motion in all major joints. No tenderness to palpation or major deformities noted.   Neurologic: Alert & oriented x 3, Normal motor function, Normal sensory function, No focal deficits noted. Reflexes are normal.  Psychiatric: Affect normal, Judgment normal, Mood normal. There is no suicidal ideation or patient reported hallucinations.     DIAGNOSTIC STUDIES / PROCEDURES    LABS  Results for orders placed or performed during the hospital encounter of 10/27/22   CBC WITH DIFFERENTIAL   Result Value Ref Range    WBC 7.2 4.8 - 10.8 K/uL    RBC 5.66 4.70 - 6.10 M/uL    Hemoglobin 16.2 14.0 - 18.0 g/dL    Hematocrit 47.0 42.0 - 52.0 %    MCV 83.0 81.4 - 97.8 fL    MCH 28.6 27.0 - 33.0 pg    MCHC 34.5 33.7 - 35.3 g/dL    RDW 39.4 35.9 - 50.0 fL    Platelet Count 273 164 - 446 K/uL    MPV 8.8 (L) 9.0 - 12.9 fL    Neutrophils-Polys 78.90 (H) 44.00 - 72.00 %    Lymphocytes 14.60 (L) 22.00 - 41.00 %    Monocytes 4.90 0.00 - 13.40 %    Eosinophils 0.80 0.00 - 6.90 %    Basophils 0.40 0.00 - 1.80 %    Immature Granulocytes 0.40 0.00 - 0.90 %    Nucleated RBC 0.00 /100 WBC    Neutrophils (Absolute) 5.67 1.82 - 7.42 K/uL    Lymphs (Absolute) 1.05 1.00 - 4.80 K/uL    Monos (Absolute) 0.35 0.00 - 0.85 K/uL    Eos (Absolute) 0.06 0.00 - 0.51 K/uL    Baso (Absolute) 0.03 0.00 - 0.12 K/uL    Immature Granulocytes (abs) 0.03 0.00 - 0.11 K/uL    NRBC (Absolute) 0.00 K/uL   COMP METABOLIC PANEL   Result Value Ref Range    Sodium 137 135 - 145 mmol/L    Potassium 3.9 3.6 - 5.5 mmol/L    Chloride 102 96 - 112 mmol/L    Co2 20 20 - 33 mmol/L    Anion Gap 15.0 7.0 - 16.0    Glucose 149 (H) 65 - 99 mg/dL    Bun 17 8 - 22 mg/dL    Creatinine  0.89 0.50 - 1.40 mg/dL    Calcium 9.4 8.4 - 10.2 mg/dL    AST(SGOT) 27 12 - 45 U/L    ALT(SGPT) 54 (H) 2 - 50 U/L    Alkaline Phosphatase 91 30 - 99 U/L    Total Bilirubin 0.4 0.1 - 1.5 mg/dL    Albumin 4.7 3.2 - 4.9 g/dL    Total Protein 7.5 6.0 - 8.2 g/dL    Globulin 2.8 1.9 - 3.5 g/dL    A-G Ratio 1.7 g/dL   TROPONIN   Result Value Ref Range    Troponin T <6 6 - 19 ng/L   URINALYSIS CULTURE, IF INDICATED    Specimen: Blood   Result Value Ref Range    Color Yellow     Character Clear     Specific Gravity 1.025 <1.035    Ph 6.0 5.0 - 8.0    Glucose Negative Negative mg/dL    Ketones Trace (A) Negative mg/dL    Protein Negative Negative mg/dL    Bilirubin Negative Negative    Nitrite Negative Negative    Leukocyte Esterase Negative Negative    Occult Blood Large (A) Negative    Micro Urine Req Microscopic    URINE MICROSCOPIC (W/UA)   Result Value Ref Range    WBC 10-20 (A) /hpf    RBC 10-20 (A) /hpf    Bacteria Few (A) None /hpf    Epithelial Cells Rare Few /hpf    Mucous Threads Moderate /hpf   URINE CULTURE(NEW)    Specimen: Urine   Result Value Ref Range    Significant Indicator NEG     Source UR     Site -     Culture Result -    ESTIMATED GFR   Result Value Ref Range    GFR (CKD-EPI) 119 >60 mL/min/1.73 m 2      All labs reviewed by me. Significant for no leukocytosis, no anemia, normal electrolytes, normal renal function, normal liver enzymes, normal bilirubin, troponin negative, urinalysis negative for infection    RADIOLOGY  CT-RENAL COLIC EVALUATION(A/P W/O)   Final Result         1.  2 mm distal right ureteral stone without appreciated obstructive changes   2.  Hepatomegaly and diffuse hepatic steatosis        The radiologist's interpretation of all radiological studies have been reviewed by me.    COURSE & MEDICAL DECISION MAKING  Nursing notes, VS, PMSFHx, labs, imaging, EKG reviewed in chart.    MDM: 2:49 AM Mitchel Sandy is a 27 y.o. male who presented with sudden onset of nausea,  vomiting and right-sided flank pain as well as pelvic pain that started approximate 2 and half hours prior to arrival.  Patient took 1000 mg of Motrin and pain subsided about 20 minutes for arriving.  He does describe 1 episode of dysuria and pelvic discomfort with urinating here in the emergency department.  He felt normal last night we went to bed.  Vital signs show hypertension otherwise unremarkable.  Physical exam shows that he is morbidly obese but he has no pain now and no reproducible tenderness in the abdomen or flank.  No tenderness or concerns of  issues, denies scrotal pain, no penile drip or concerns for STD.  Labs ordered and CT renal colic protocol ordered.  I placed an ultrasound-guided IV as nursing was unable to get IV started.  CT imaging shows small 2 mm distal right ureteral stone.  Labs including CBC, CMP, troponin and urinalysis are negative for any acute actionable items.  Patient's pain is well controlled.  He be discharged with Flomax, Lortab, and urology follow-up as needed.  He verbalized understanding strict return precautions outpatient follow-up plan and is amenable.    Peripheral Venous Access with US Guidance CPT Code 02295,  Limited Diagnostic Exam: Venipuncture requiring physician skill with ultrasound guidance  Performed by Self  Patient Identity confirmed: Yes  Risks, benefits and alternatives were discussed  Consent given by: Patient   Indication for Exam: Vascular Access   Vein/Location: left AC  Normal Compressibility and Visualized Patency   Catheter Size: 20 gauge   Number of Attempts: 1   Successful Catheter Insertion: Yes   Complications: None     FINAL IMPRESSION  1. Kidney stone Acute   2. Nausea and vomiting, unspecified vomiting type Acute   3. Constipation, unspecified constipation type Acute      The note accurately reflects work and decisions made by me.  Bhargav Arndt  10/27/2022  2:49 AM

## 2022-10-27 NOTE — ED NOTES
PT IS AAOX4, NAD. PT IS BEING D/C. PT WAS GIVEN D/C INSTRUCTIONS AND THEY STATED UNDERSTANDING. PT DENIES PAIN AT THIS TIME.

## 2022-10-29 LAB
BACTERIA UR CULT: NORMAL
SIGNIFICANT IND 70042: NORMAL
SITE SITE: NORMAL
SOURCE SOURCE: NORMAL

## 2022-11-10 ENCOUNTER — APPOINTMENT (OUTPATIENT)
Dept: MEDICAL GROUP | Facility: PHYSICIAN GROUP | Age: 27
End: 2022-11-10
Payer: COMMERCIAL

## 2022-11-14 ENCOUNTER — OFFICE VISIT (OUTPATIENT)
Dept: MEDICAL GROUP | Facility: PHYSICIAN GROUP | Age: 27
End: 2022-11-14
Payer: COMMERCIAL

## 2022-11-14 VITALS
OXYGEN SATURATION: 97 % | BODY MASS INDEX: 45.1 KG/M2 | HEART RATE: 88 BPM | DIASTOLIC BLOOD PRESSURE: 103 MMHG | TEMPERATURE: 98.6 F | SYSTOLIC BLOOD PRESSURE: 158 MMHG | HEIGHT: 70 IN | WEIGHT: 315 LBS

## 2022-11-14 DIAGNOSIS — N20.0 RENAL CALCULUS, RIGHT: ICD-10-CM

## 2022-11-14 DIAGNOSIS — Z13.1 ENCOUNTER FOR SCREENING FOR DIABETES MELLITUS: ICD-10-CM

## 2022-11-14 DIAGNOSIS — I10 PRIMARY HYPERTENSION: ICD-10-CM

## 2022-11-14 DIAGNOSIS — Z02.89 ENCOUNTER FOR COMPLETION OF FORM WITH PATIENT: ICD-10-CM

## 2022-11-14 DIAGNOSIS — D49.0 TUMOR OF TONGUE: ICD-10-CM

## 2022-11-14 DIAGNOSIS — Z13.220 ENCOUNTER FOR SCREENING FOR LIPID DISORDER: ICD-10-CM

## 2022-11-14 DIAGNOSIS — L05.01 PILONIDAL CYST WITH ABSCESS: ICD-10-CM

## 2022-11-14 DIAGNOSIS — Z13.21 ENCOUNTER FOR VITAMIN DEFICIENCY SCREENING: ICD-10-CM

## 2022-11-14 DIAGNOSIS — R29.818 SUSPECTED SLEEP APNEA: ICD-10-CM

## 2022-11-14 DIAGNOSIS — R53.83 OTHER FATIGUE: ICD-10-CM

## 2022-11-14 DIAGNOSIS — Z13.29 SCREENING FOR THYROID DISORDER: ICD-10-CM

## 2022-11-14 PROBLEM — N28.9 KIDNEY DISEASE: Status: ACTIVE | Noted: 2022-11-14

## 2022-11-14 PROCEDURE — 99214 OFFICE O/P EST MOD 30 MIN: CPT | Performed by: NURSE PRACTITIONER

## 2022-11-14 RX ORDER — LISINOPRIL 10 MG/1
10 TABLET ORAL DAILY
Qty: 30 TABLET | Refills: 1 | Status: SHIPPED | OUTPATIENT
Start: 2022-11-14 | End: 2022-12-09

## 2022-11-14 ASSESSMENT — PATIENT HEALTH QUESTIONNAIRE - PHQ9: CLINICAL INTERPRETATION OF PHQ2 SCORE: 0

## 2022-11-14 ASSESSMENT — FIBROSIS 4 INDEX: FIB4 SCORE: 0.38

## 2022-11-15 NOTE — PROGRESS NOTES
"Establish care and hospital follow up  Follow-Up (Went to er with extremely high B/P  2 wks ago, also believes to have a kidney stone)                                                                                                                                         HPI:   Mitchel presents today with the following.    Problem   Renal Calculus, Right   Primary Hypertension   Suspected Sleep Apnea   Pilonidal Cyst With Abscess       Current Outpatient Medications   Medication Sig Dispense Refill    lisinopril (PRINIVIL) 10 MG Tab Take 1 Tablet by mouth every day. 30 Tablet 1    tamsulosin (FLOMAX) 0.4 MG capsule Take 1 Capsule by mouth every day. 30 Capsule 0    ondansetron (ZOFRAN ODT) 4 MG TABLET DISPERSIBLE Take 1 Tablet by mouth every 6 hours as needed for Nausea. 10 Tablet 0     No current facility-administered medications for this visit.       Allergies as of 11/14/2022 - Reviewed 11/14/2022   Allergen Reaction Noted    Horse allergy  04/28/2021        ROS:  All systems negative expect as addressed in assessment and plan.     BP (!) 158/103 (BP Location: Right arm, Patient Position: Sitting, BP Cuff Size: Large adult)   Pulse 88   Temp 37 °C (98.6 °F) (Temporal)   Ht 1.778 m (5' 10\")   Wt (!) 164 kg (360 lb 9.6 oz)   SpO2 97%   BMI 51.74 kg/m²       Physical Exam  Vitals reviewed.   Constitutional:       Appearance: Normal appearance.   HENT:      Head: Normocephalic and atraumatic.      Mouth/Throat:      Mouth: Mucous membranes are moist.   Eyes:      Extraocular Movements: Extraocular movements intact.      Conjunctiva/sclera: Conjunctivae normal.   Pulmonary:      Effort: Pulmonary effort is normal.   Musculoskeletal:         General: Normal range of motion.      Cervical back: Normal range of motion.   Skin:     General: Skin is warm and dry.   Neurological:      General: No focal deficit present.      Mental Status: He is alert and oriented to person, place, and time.   Psychiatric:         Mood " and Affect: Mood normal.         Behavior: Behavior normal.         Thought Content: Thought content normal.         Assessment and Plan:  27 y.o. male with the following issues.    1. Renal calculus, right        2. Primary hypertension  lisinopril (PRINIVIL) 10 MG Tab      3. Tumor of tongue        4. Suspected sleep apnea  Referral to Pulmonary and Sleep Medicine    Testosterone, Free & Total, Adult Male (w/SHBG)      5. Pilonidal cyst with abscess        6. Encounter for vitamin deficiency screening  VITAMIN D,25 HYDROXY (DEFICIENCY)      7. Screening for thyroid disorder  TSH    FREE THYROXINE      8. Encounter for screening for diabetes mellitus  HEMOGLOBIN A1C    Comp Metabolic Panel      9. Other fatigue  TSH    FREE THYROXINE    HEMOGLOBIN A1C    Comp Metabolic Panel    Testosterone, Free & Total, Adult Male (w/SHBG)    ESTROGENS FRACTIONATED      10. Encounter for screening for lipid disorder  Lipid Profile           Primary hypertension  This is a chronic uncontrolled condition. He has been told for years he has high blood pressure. He has never been on medication. He has an extensive family family history of hypertension. He denies headaches. He reports occasional chest pain.      Will start lisinopril 10 mg daily.        Suspected sleep apnea  Patient reports that he does snore. He wakes up multiple times a night. His SO also reports that he stops breathing multiple times a night.     Will send referral to sleep medicine.     Pilonidal cyst with abscess  This is a chronic condition. Patient has had surgery 2 times to have this removed.     Renal calculus, right  This is an acute condition.  Patient went to the ER on October 27 for acute flank pain.  Patient was found to have a 2 mm renal calculi in his right ureter.  Patient reports that he was sent home with Zofran as well as Flomax and advised to follow-up with his PCP.  Patient states that he has still been having severe intermittent pain as well as  lower pelvic pain.    Patient is unsure if he has passed the stone yet.     Patient to continue Flomax as well as adequate hydration.  Patient to return in 1 week.  If patient continues to have pain will place urgent referral to urology.    Return in about 2 weeks (around 11/28/2022) for follow up for labs and blood pressure.    I have placed the below orders and discussed them with an approved delegating provider.  The MA is performing the below orders under the direction of Dr. rosas.    Please note that this dictation was created using voice recognition software. I have worked with consultants from the vendor as well as technical experts from [facility] to optimize the interface. I have made every reasonable attempt to correct obvious errors, but I expect that there are errors of grammar and possibly content that I did not discover before finalizing the note.

## 2022-11-15 NOTE — ASSESSMENT & PLAN NOTE
This is a chronic uncontrolled condition. He has been told for years he has high blood pressure. He has never been on medication. He has an extensive family family history of hypertension. He denies headaches. He reports occasional chest pain.      Will start lisinopril 10 mg daily.

## 2022-11-15 NOTE — ASSESSMENT & PLAN NOTE
Patient reports that he does snore. He wakes up multiple times a night. His SO also reports that he stops breathing multiple times a night.     Will send referral to sleep medicine.

## 2022-11-16 ENCOUNTER — TELEPHONE (OUTPATIENT)
Dept: MEDICAL GROUP | Facility: PHYSICIAN GROUP | Age: 27
End: 2022-11-16
Payer: COMMERCIAL

## 2022-11-17 NOTE — TELEPHONE ENCOUNTER
Phone Number Called: [phone] (home)       Call outcome: Left detailed message for patient. Informed to call back with any additional questions.    Message: Pt needs to sign FMLA ppw. I can mail the paperwork or pt can can stop by the office.

## 2022-11-17 NOTE — ASSESSMENT & PLAN NOTE
This is an acute condition.  Patient went to the ER on October 27 for acute flank pain.  Patient was found to have a 2 mm renal calculi in his right ureter.  Patient reports that he was sent home with Zofran as well as Flomax and advised to follow-up with his PCP.  Patient states that he has still been having severe intermittent pain as well as lower pelvic pain.    Patient is unsure if he has passed the stone yet.     Patient to continue Flomax as well as adequate hydration.  Patient to return in 1 week.  If patient continues to have pain will place urgent referral to urology.

## 2022-11-23 NOTE — TELEPHONE ENCOUNTER
Phone Number Called: [phone] (home)       Call outcome: Spoke to patient regarding message below.    Message: Pt was going to stop by at the office. Ppw placed up front.

## 2022-12-05 LAB
25(OH)D3+25(OH)D2 SERPL-MCNC: 14.8 NG/ML (ref 30–100)
ALBUMIN SERPL-MCNC: 4.8 G/DL (ref 4.1–5.2)
ALBUMIN/GLOB SERPL: 1.8 {RATIO} (ref 1.2–2.2)
ALP SERPL-CCNC: 99 IU/L (ref 44–121)
ALT SERPL-CCNC: 62 IU/L (ref 0–44)
AST SERPL-CCNC: 32 IU/L (ref 0–40)
BILIRUB SERPL-MCNC: 0.5 MG/DL (ref 0–1.2)
BUN SERPL-MCNC: 19 MG/DL (ref 6–20)
BUN/CREAT SERPL: 23 (ref 9–20)
CALCIUM SERPL-MCNC: 9.9 MG/DL (ref 8.7–10.2)
CHLORIDE SERPL-SCNC: 101 MMOL/L (ref 96–106)
CHOLEST SERPL-MCNC: 200 MG/DL (ref 100–199)
CO2 SERPL-SCNC: 21 MMOL/L (ref 20–29)
CREAT SERPL-MCNC: 0.81 MG/DL (ref 0.76–1.27)
EGFRCR SERPLBLD CKD-EPI 2021: 123 ML/MIN/1.73
ESTRADIOL SERPL-MCNC: 49.3 PG/ML (ref 7.6–42.6)
ESTRONE SERPL-MCNC: 59 PG/ML (ref 0–174)
GLOBULIN SER CALC-MCNC: 2.7 G/DL (ref 1.5–4.5)
GLUCOSE SERPL-MCNC: 117 MG/DL (ref 70–99)
HBA1C MFR BLD: 6.2 % (ref 4.8–5.6)
HDLC SERPL-MCNC: 37 MG/DL
LABORATORY COMMENT REPORT: ABNORMAL
LDLC SERPL CALC-MCNC: 120 MG/DL (ref 0–99)
POTASSIUM SERPL-SCNC: 4.7 MMOL/L (ref 3.5–5.2)
PROT SERPL-MCNC: 7.5 G/DL (ref 6–8.5)
SHBG SERPL-SCNC: 6.1 NMOL/L (ref 16.5–55.9)
SODIUM SERPL-SCNC: 141 MMOL/L (ref 134–144)
T4 FREE SERPL-MCNC: 1.19 NG/DL (ref 0.82–1.77)
TESTOST FREE SERPL-MCNC: 8.4 PG/ML (ref 9.3–26.5)
TESTOST SERPL-MCNC: 142 NG/DL (ref 264–916)
TRIGL SERPL-MCNC: 245 MG/DL (ref 0–149)
TSH SERPL DL<=0.005 MIU/L-ACNC: 1.23 UIU/ML (ref 0.45–4.5)
VLDLC SERPL CALC-MCNC: 43 MG/DL (ref 5–40)

## 2022-12-09 ENCOUNTER — OFFICE VISIT (OUTPATIENT)
Dept: MEDICAL GROUP | Facility: PHYSICIAN GROUP | Age: 27
End: 2022-12-09
Payer: COMMERCIAL

## 2022-12-09 VITALS
TEMPERATURE: 98.1 F | RESPIRATION RATE: 16 BRPM | HEIGHT: 70 IN | HEART RATE: 80 BPM | WEIGHT: 315 LBS | BODY MASS INDEX: 45.1 KG/M2 | DIASTOLIC BLOOD PRESSURE: 82 MMHG | SYSTOLIC BLOOD PRESSURE: 134 MMHG

## 2022-12-09 DIAGNOSIS — I10 PRIMARY HYPERTENSION: ICD-10-CM

## 2022-12-09 DIAGNOSIS — E66.01 CLASS 3 SEVERE OBESITY DUE TO EXCESS CALORIES WITH SERIOUS COMORBIDITY AND BODY MASS INDEX (BMI) OF 50.0 TO 59.9 IN ADULT (HCC): ICD-10-CM

## 2022-12-09 DIAGNOSIS — R79.89 LOW TESTOSTERONE IN MALE: ICD-10-CM

## 2022-12-09 DIAGNOSIS — R79.89 HIGH SERUM ESTRADIOL: ICD-10-CM

## 2022-12-09 PROCEDURE — 99215 OFFICE O/P EST HI 40 MIN: CPT | Performed by: NURSE PRACTITIONER

## 2022-12-09 RX ORDER — DULAGLUTIDE 0.75 MG/.5ML
0.5 INJECTION, SOLUTION SUBCUTANEOUS
Qty: 6 ML | Refills: 3 | Status: SHIPPED | OUTPATIENT
Start: 2022-12-09

## 2022-12-09 RX ORDER — METFORMIN HYDROCHLORIDE 500 MG/1
1000 TABLET, EXTENDED RELEASE ORAL DAILY
Qty: 180 TABLET | Refills: 1 | Status: SHIPPED | OUTPATIENT
Start: 2022-12-09

## 2022-12-09 RX ORDER — AMLODIPINE BESYLATE 5 MG/1
5 TABLET ORAL DAILY
Qty: 90 TABLET | Refills: 1 | Status: SHIPPED | OUTPATIENT
Start: 2022-12-09

## 2022-12-09 ASSESSMENT — FIBROSIS 4 INDEX: FIB4 SCORE: 0.42

## 2022-12-09 NOTE — PROGRESS NOTES
"  Chief Complaint   Patient presents with    Follow-Up     Labs                                                                                                                                     HPI:   Mitchel presents today with the following.    Problem   Class 3 Severe Obesity With Serious Comorbidity and Body Mass Index (Bmi) of 50.0 to 59.9 in Adult ([facility])   Low Testosterone in Male   Primary Hypertension       Current Outpatient Medications   Medication Sig Dispense Refill    amLODIPine (NORVASC) 5 MG Tab Take 1 Tablet by mouth every day. 90 Tablet 1    Cholecalciferol 18919 UNIT Cap Take 1 Capsule by mouth every 7 days. 12 Capsule 1    metFORMIN ER (GLUCOPHAGE XR) 500 MG TABLET SR 24 HR Take 2 Tablets by mouth every day. 180 Tablet 1    Dulaglutide (TRULICITY) 0.75 MG/0.5ML Solution Pen-injector Inject 0.5 mL under the skin every 7 days. 6 mL 3     No current facility-administered medications for this visit.       Allergies as of 12/09/2022 - Reviewed 12/09/2022   Allergen Reaction Noted    Lisinopril  12/09/2022    Horse allergy  04/28/2021        ROS:  All systems negative expect as addressed in assessment and plan.     /82 (BP Location: Left arm, Patient Position: Sitting, BP Cuff Size: Small adult)   Pulse 80   Temp 36.7 °C (98.1 °F) (Temporal)   Resp 16   Ht 1.778 m (5' 10\")   Wt (!) 162 kg (358 lb)   BMI 51.37 kg/m²       Physical Exam  Vitals reviewed.   Constitutional:       Appearance: Normal appearance.   HENT:      Head: Normocephalic and atraumatic.      Mouth/Throat:      Mouth: Mucous membranes are moist.   Eyes:      Extraocular Movements: Extraocular movements intact.      Conjunctiva/sclera: Conjunctivae normal.   Pulmonary:      Effort: Pulmonary effort is normal.   Musculoskeletal:         General: Normal range of motion.      Cervical back: Normal range of motion.   Skin:     General: Skin is warm and dry.   Neurological:      General: No focal deficit present.      Mental " Status: He is alert and oriented to person, place, and time.   Psychiatric:         Mood and Affect: Mood normal.         Behavior: Behavior normal.         Thought Content: Thought content normal.       Assessment and Plan:  27 y.o. male with the following issues.    1. Primary hypertension        2. Low testosterone in male  Referral to Urology      3. High serum estradiol  Referral to Urology      4. Class 3 severe obesity due to excess calories with serious comorbidity and body mass index (BMI) of 50.0 to 59.9 in adult ([facility])             Primary hypertension  This is a chronic condition. Patient was started on lisinopril. He states that he started having severe headaches daily with taking the lisinopril.     Will discontinue lisinopril. Will start amlodipine 5 mg.      Class 3 severe obesity with serious comorbidity and body mass index (BMI) of 50.0 to 59.9 in adult ([facility])  This is a chronic condition. Discussed with patient weight loss and how it will help his other chronic health condition.     Had lengthy discussion with the patient of his comorbidities with his obesity.  Discussed that weight loss will help with his other comorbidities.      Will start metformin and trulicity to help with both prediabetes and weight loss.         Low testosterone in male  This is a new finding.  Patient has quite low testosterone as well as elevated estrogen.  Discussed with patient that if he does truly have sleep apnea as we suspect that this may be contributing to this.  This also may be the cause of patient's low libido.     Will place referral to urology for management of patient's testosterone and high estrogen levels.      Return in about 2 months (around 2/9/2023) for follow up for weight and blood pressure.    I spent a total of 49 minutes with record review, exam, and communication with the patient, communication with other providers, and documentation of this encounter. This does not include time spent on separately  billable procedures/tests.    I have placed the below orders and discussed them with an approved delegating provider.  The MA is performing the below orders under the direction of Dr. De La Cruz.    Please note that this dictation was created using voice recognition software. I have worked with consultants from the vendor as well as technical experts from [employer] [facility] to optimize the interface. I have made every reasonable attempt to correct obvious errors, but I expect that there are errors of grammar and possibly content that I did not discover before finalizing the note.

## 2022-12-09 NOTE — ASSESSMENT & PLAN NOTE
This is a new finding.  Patient has quite low testosterone as well as elevated estrogen.  Discussed with patient that if he does truly have sleep apnea as we suspect that this may be contributing to this.  This also may be the cause of patient's low libido.     Will place referral to urology for management of patient's testosterone and high estrogen levels.

## 2022-12-09 NOTE — ASSESSMENT & PLAN NOTE
This is a chronic condition. Patient was started on lisinopril. He states that he started having severe headaches daily with taking the lisinopril.     Will discontinue lisinopril. Will start amlodipine 5 mg.

## 2022-12-09 NOTE — ASSESSMENT & PLAN NOTE
This is a chronic condition. Discussed with patient weight loss and how it will help his other chronic health condition.     Had lengthy discussion with the patient of his comorbidities with his obesity.  Discussed that weight loss will help with his other comorbidities.      Will start metformin and trulicity to help with both prediabetes and weight loss.

## 2022-12-23 ENCOUNTER — APPOINTMENT (OUTPATIENT)
Dept: RADIOLOGY | Facility: MEDICAL CENTER | Age: 27
End: 2022-12-23
Attending: EMERGENCY MEDICINE
Payer: COMMERCIAL

## 2022-12-23 ENCOUNTER — HOSPITAL ENCOUNTER (EMERGENCY)
Facility: MEDICAL CENTER | Age: 27
End: 2022-12-23
Attending: EMERGENCY MEDICINE
Payer: COMMERCIAL

## 2022-12-23 VITALS
TEMPERATURE: 98 F | WEIGHT: 315 LBS | RESPIRATION RATE: 20 BRPM | SYSTOLIC BLOOD PRESSURE: 166 MMHG | DIASTOLIC BLOOD PRESSURE: 107 MMHG | BODY MASS INDEX: 44.1 KG/M2 | HEIGHT: 71 IN | HEART RATE: 69 BPM | OXYGEN SATURATION: 95 %

## 2022-12-23 DIAGNOSIS — N20.1 URETERAL STONE: ICD-10-CM

## 2022-12-23 LAB
ALBUMIN SERPL BCP-MCNC: 4.9 G/DL (ref 3.2–4.9)
ALBUMIN/GLOB SERPL: 1.8 G/DL
ALP SERPL-CCNC: 88 U/L (ref 30–99)
ALT SERPL-CCNC: 67 U/L (ref 2–50)
ANION GAP SERPL CALC-SCNC: 15 MMOL/L (ref 7–16)
APPEARANCE UR: CLEAR
AST SERPL-CCNC: 35 U/L (ref 12–45)
BACTERIA #/AREA URNS HPF: ABNORMAL /HPF
BASOPHILS # BLD AUTO: 0.5 % (ref 0–1.8)
BASOPHILS # BLD: 0.04 K/UL (ref 0–0.12)
BILIRUB SERPL-MCNC: 0.3 MG/DL (ref 0.1–1.5)
BILIRUB UR QL STRIP.AUTO: NEGATIVE
BUN SERPL-MCNC: 19 MG/DL (ref 8–22)
CALCIUM ALBUM COR SERPL-MCNC: 9.3 MG/DL (ref 8.5–10.5)
CALCIUM SERPL-MCNC: 10 MG/DL (ref 8.4–10.2)
CHLORIDE SERPL-SCNC: 106 MMOL/L (ref 96–112)
CO2 SERPL-SCNC: 21 MMOL/L (ref 20–33)
COLOR UR: YELLOW
CREAT SERPL-MCNC: 0.86 MG/DL (ref 0.5–1.4)
EOSINOPHIL # BLD AUTO: 0.21 K/UL (ref 0–0.51)
EOSINOPHIL NFR BLD: 2.7 % (ref 0–6.9)
EPI CELLS #/AREA URNS HPF: ABNORMAL /HPF
ERYTHROCYTE [DISTWIDTH] IN BLOOD BY AUTOMATED COUNT: 39.8 FL (ref 35.9–50)
GFR SERPLBLD CREATININE-BSD FMLA CKD-EPI: 121 ML/MIN/1.73 M 2
GLOBULIN SER CALC-MCNC: 2.7 G/DL (ref 1.9–3.5)
GLUCOSE SERPL-MCNC: 105 MG/DL (ref 65–99)
GLUCOSE UR STRIP.AUTO-MCNC: NEGATIVE MG/DL
HCT VFR BLD AUTO: 47.5 % (ref 42–52)
HGB BLD-MCNC: 16.5 G/DL (ref 14–18)
IMM GRANULOCYTES # BLD AUTO: 0.03 K/UL (ref 0–0.11)
IMM GRANULOCYTES NFR BLD AUTO: 0.4 % (ref 0–0.9)
KETONES UR STRIP.AUTO-MCNC: NEGATIVE MG/DL
LEUKOCYTE ESTERASE UR QL STRIP.AUTO: NEGATIVE
LYMPHOCYTES # BLD AUTO: 2.44 K/UL (ref 1–4.8)
LYMPHOCYTES NFR BLD: 31.2 % (ref 22–41)
MCH RBC QN AUTO: 28.8 PG (ref 27–33)
MCHC RBC AUTO-ENTMCNC: 34.7 G/DL (ref 33.7–35.3)
MCV RBC AUTO: 83 FL (ref 81.4–97.8)
MICRO URNS: ABNORMAL
MONOCYTES # BLD AUTO: 0.55 K/UL (ref 0–0.85)
MONOCYTES NFR BLD AUTO: 7 % (ref 0–13.4)
MUCOUS THREADS #/AREA URNS HPF: ABNORMAL /HPF
NEUTROPHILS # BLD AUTO: 4.54 K/UL (ref 1.82–7.42)
NEUTROPHILS NFR BLD: 58.2 % (ref 44–72)
NITRITE UR QL STRIP.AUTO: NEGATIVE
NRBC # BLD AUTO: 0 K/UL
NRBC BLD-RTO: 0 /100 WBC
PH UR STRIP.AUTO: 5.5 [PH] (ref 5–8)
PLATELET # BLD AUTO: 305 K/UL (ref 164–446)
PMV BLD AUTO: 8.7 FL (ref 9–12.9)
POTASSIUM SERPL-SCNC: 4 MMOL/L (ref 3.6–5.5)
PROT SERPL-MCNC: 7.6 G/DL (ref 6–8.2)
PROT UR QL STRIP: NEGATIVE MG/DL
RBC # BLD AUTO: 5.72 M/UL (ref 4.7–6.1)
RBC # URNS HPF: ABNORMAL /HPF
RBC UR QL AUTO: ABNORMAL
SODIUM SERPL-SCNC: 142 MMOL/L (ref 135–145)
SP GR UR STRIP.AUTO: >=1.03
WBC # BLD AUTO: 7.8 K/UL (ref 4.8–10.8)
WBC #/AREA URNS HPF: ABNORMAL /HPF

## 2022-12-23 PROCEDURE — 74176 CT ABD & PELVIS W/O CONTRAST: CPT

## 2022-12-23 PROCEDURE — 96376 TX/PRO/DX INJ SAME DRUG ADON: CPT

## 2022-12-23 PROCEDURE — 700105 HCHG RX REV CODE 258: Performed by: EMERGENCY MEDICINE

## 2022-12-23 PROCEDURE — 81001 URINALYSIS AUTO W/SCOPE: CPT

## 2022-12-23 PROCEDURE — 96374 THER/PROPH/DIAG INJ IV PUSH: CPT

## 2022-12-23 PROCEDURE — 80053 COMPREHEN METABOLIC PANEL: CPT

## 2022-12-23 PROCEDURE — 700111 HCHG RX REV CODE 636 W/ 250 OVERRIDE (IP): Performed by: EMERGENCY MEDICINE

## 2022-12-23 PROCEDURE — 85025 COMPLETE CBC W/AUTO DIFF WBC: CPT

## 2022-12-23 PROCEDURE — 96375 TX/PRO/DX INJ NEW DRUG ADDON: CPT

## 2022-12-23 PROCEDURE — 36415 COLL VENOUS BLD VENIPUNCTURE: CPT

## 2022-12-23 PROCEDURE — 99285 EMERGENCY DEPT VISIT HI MDM: CPT

## 2022-12-23 RX ORDER — ONDANSETRON 2 MG/ML
4 INJECTION INTRAMUSCULAR; INTRAVENOUS ONCE
Status: COMPLETED | OUTPATIENT
Start: 2022-12-23 | End: 2022-12-23

## 2022-12-23 RX ORDER — HYDROCODONE BITARTRATE AND ACETAMINOPHEN 5; 325 MG/1; MG/1
1-2 TABLET ORAL EVERY 4 HOURS PRN
Qty: 12 TABLET | Refills: 0 | Status: SHIPPED | OUTPATIENT
Start: 2022-12-23 | End: 2022-12-26

## 2022-12-23 RX ORDER — SODIUM CHLORIDE 9 MG/ML
1000 INJECTION, SOLUTION INTRAVENOUS ONCE
Status: COMPLETED | OUTPATIENT
Start: 2022-12-23 | End: 2022-12-23

## 2022-12-23 RX ORDER — MORPHINE SULFATE 4 MG/ML
4 INJECTION INTRAVENOUS
Status: DISCONTINUED | OUTPATIENT
Start: 2022-12-23 | End: 2022-12-23 | Stop reason: HOSPADM

## 2022-12-23 RX ORDER — ONDANSETRON 4 MG/1
4 TABLET, ORALLY DISINTEGRATING ORAL EVERY 8 HOURS PRN
Qty: 10 TABLET | Refills: 0 | Status: SHIPPED | OUTPATIENT
Start: 2022-12-23

## 2022-12-23 RX ADMIN — MORPHINE SULFATE 4 MG: 4 INJECTION INTRAVENOUS at 03:10

## 2022-12-23 RX ADMIN — MORPHINE SULFATE 4 MG: 4 INJECTION INTRAVENOUS at 03:48

## 2022-12-23 RX ADMIN — SODIUM CHLORIDE 1000 ML: 9 INJECTION, SOLUTION INTRAVENOUS at 03:10

## 2022-12-23 RX ADMIN — ONDANSETRON 4 MG: 2 INJECTION INTRAMUSCULAR; INTRAVENOUS at 03:10

## 2022-12-23 ASSESSMENT — FIBROSIS 4 INDEX: FIB4 SCORE: 0.42

## 2022-12-23 ASSESSMENT — PAIN DESCRIPTION - PAIN TYPE: TYPE: ACUTE PAIN

## 2022-12-23 ASSESSMENT — PAIN DESCRIPTION - DESCRIPTORS: DESCRIPTORS: OTHER (COMMENT)

## 2022-12-23 NOTE — ED TRIAGE NOTES
"27 yr old male walked from triage to room.     Pt reports took 800mg ibuprofen before coming in today.     Chief Complaint   Patient presents with    Flank Pain     L flank pain starting at 0230 this morning sudden onset.   Pt reports no N/V/D.   Pt reports it feels like kidney stone pain.       BP (!) 174/125   Pulse 84   Temp 36.6 °C (97.9 °F) (Temporal)   Resp 20   Ht 1.803 m (5' 11\")   Wt (!) 160 kg (351 lb 13.7 oz)   SpO2 98%   BMI 49.07 kg/m²     "

## 2022-12-23 NOTE — ED NOTES
Urology Progress Note  9/6/2022      CHIEF COMPLAINT  Follow-up (f/u 3m Nephrolithiasis, +imaging prior )     HISTORY OF PRESENT ILLNESS  Ms. Mancia is a 36 year old female,        Hx:  1. Hx ureteroscopy- Dr. Mayberry 2017  2. Hx kidney stones- s/p ureteroscopy 8/2018 Dr. Ingram/Dr. Siddiqi  3. Calcium phosphate stones   4. Thought maybe has seen some stone grit passage since surgery 2018.  5. Saw Dr. Reddy for neph consult 12/2018- pregnant at that time, never had follow up with metabolic workup.    6. Bilateral renal calculi and 8mm left UPJ stone since 1/4/2022 s/p left URS, HLL and stent 3/10/22  7. S/p right ureteroscopy with laser lithotripsy and stent placement 05/26/2022 with externalized string  8. Stone composition right 05/26/2022 -  30% calcium oxalate monohydrate, and   70% calcium phosphate (hydroxy- and carbonate- apatite).   - left - 3/22 -20% calcium oxalate monohydrate, and   80% calcium phosphate (hydroxy- and carbonate- apatite).         Patient here for post-op visit s/p R URS with HLL and stent 5/26    Pt is very stressed about life.  Pt has been having intermittent abdominal pain.  One night with pain a few weeks ago.   Pt got better with by next day.    No hematuria or dysuria.  Very mild stress incontinence.    Pt with intermittent bilateral intermittent flank pain - sporadic.       MEDICATIONS  The patient's current medications were reviewed.  Current Outpatient Medications   Medication Sig   • furosemide (LASIX) 20 MG tablet Take 3 tablets by mouth every morning.   • ondansetron (Zofran ODT) 4 MG disintegrating tablet Place 1 tablet onto the tongue every 8 hours as needed for Nausea.   • Orenitram 5 MG ER tablet Take 1 tablet by mouth 3 times daily. To decrease dose to 4.75 mg by 0.125 mg weekly   • omeprazole (PrilOSEC) 20 MG capsule TAKE 1 CAPSULE BY MOUTH DAILY   • Tadalafil, PAH, 20 MG Tab Take 40 mg by mouth at bedtime.   • ambrisentan (LETAIRIS) 10 MG tablet Take 1 tablet by  Patient transported to imaging   mouth every morning. Indications: Pulmonary Arterial Hypertension   • trazodone (DESYREL) 150 MG tablet 150 mg nightly.    • escitalopram (LEXAPRO) 10 MG tablet Take 10 mg by mouth every morning.   • NON FORMULARY at bedtime. ID Life -Multivitamins (Supplement)  - Menopause WSupport, Heart Heatlh, Magenesium, Co Q10, VitD3, Probiotic, Omega-#, Diggestive, Bone Support, Advanced PM, Stress nShield, Anti-Oxidant, Pain Support, Bcomplex, Digestive Support    • buPROPion (WELLBUTRIN XL) 150 MG 24 hr tablet Take 150 mg by mouth every morning. Total 450mg daily   • buPROPion (WELLBUTRIN XL) 300 MG 24 hr tablet Take 300 mg by mouth every morning. Total 450mg daily     No current facility-administered medications for this visit.       ALLERGIES  Allergies as of 09/06/2022 - Reviewed 08/23/2022   Allergen Reaction Noted   • Chlorhexidine Dermatitis 07/25/2018          REVIEW OF SYSTEMS    I have reviewed my nurses note. I concur.     PHYSICAL EXAM  Blood pressure 116/72, pulse 89, SpO2 96 %.     GENERAL: Alert, oriented times 3, no acute distress.  EYES: Sclerae non-icteric.  PSYCHIATRIC: Normal mood and affect.    LABORATORY  I have reviewed the pertinent laboratory tests. These are the pertinent findings:    Component      Latest Ref Rng & Units 6/27/2022   Sodium      135 - 145 mmol/L 139   Potassium      3.4 - 5.1 mmol/L 4.3   Chloride      97 - 110 mmol/L 108   CO2      21 - 32 mmol/L 29   ANION GAP      7 - 19 mmol/L 6 (L)   Glucose      70 - 99 mg/dL 81   BUN      6 - 20 mg/dL 18   Creatinine      0.51 - 0.95 mg/dL 0.70   Glomerular Filtration Rate      >=60 >90   BUN/CREATININE RATIO      7 - 25 26 (H)   CALCIUM      8.4 - 10.2 mg/dL 9.2   URIC ACID      2.6 - 5.9 mg/dL 3.0   MAGNESIUM      1.7 - 2.4 mg/dL 2.3   PHOSPHORUS      2.4 - 4.7 mg/dL 2.9       Creatinine (mg/dL)   Date Value   08/15/2022 0.68   06/27/2022 0.70   05/24/2022 0.83   04/26/2022 0.80   02/28/2022 0.76   02/24/2022 0.78   01/17/2022 0.79    01/04/2022 0.80   12/29/2021 0.77   12/21/2021 0.74   12/02/2021 0.65   11/22/2021 0.77   10/25/2021 0.83   09/23/2021 0.82   07/23/2021 0.40 (L)   05/21/2021 0.78   03/31/2021 0.82   02/19/2021 0.86   05/11/2020 0.68   07/19/2019 0.79   08/12/2018 0.71   08/11/2018 0.62   08/10/2018 1.07 (H)   08/09/2018 0.77   08/06/2018 0.75   05/24/2015 0.90     Comment: Calculi composed primarily of:   20% calcium oxalate monohydrate, and   80% calcium phosphate (hydroxy- and carbonate- apatite).   INTERPRETIVE INFORMATION: Calculi (Stone) analysis   IMAGING  US RENAL 7/2022:  IMPRESSION:     1.  Multiple bilateral nonobstructive renal calculi measuring up to 0.6 cm.     2.  No hydronephrosis.     I, Attending Radiologist Erick Lr MD, have reviewed the images and  report and concur with these findings interpreted by Resident Radiologist,  Shayan Henley MD.     CT 7/2022:  CT ABDOMEN PELVIS FOR KIDNEY STONES WO CONTRAST 7/21/2022 12:47 PM     HISTORY: Kidney stones.     COMPARISON: CT abdomen pelvis 01/04/2022.     TECHNIQUE:  Axial CT images of the abdomen and pelvis without IV or oral  contrast. Multiplanar reformats.     FINDINGS:       Examination of the visceral organs and vascular structures is limited by  the lack of IV contrast.     LOWER CHEST:     Lung bases: Linear bandlike opacities in the left lower lobe, likely  represent atelectasis or scarring.     Heart:  Unremarkable.     ABDOMEN:     Liver:  Unremarkable.     Biliary system:  The gallbladder is surgically absent. No biliary ductal  dilatation.     Spleen:  Unremarkable.     Pancreas:  Unremarkable.     Adrenal glands:  Unremarkable.     Kidneys:  Nonobstructing renal calculus within the inferior pole of the  left kidney measures 3 mm. A few nonobstructing renal calculi within the  inferior right kidney measures up to 3 mm. No hydronephrosis.     Bowel: Unremarkable. Normal appendix.     Peritoneal cavity:  Unremarkable.     Lymph nodes:  Unremarkable.     Vascular:  Unremarkable.     Osseous structures: No acute osseous abnormality. Benign vertebral body  hemangioma within the L1 vertebral body.     Subcutaneous tissues:  Unremarkable.     PELVIS:      The urinary bladder is unremarkable. Normal CT appearance of the uterus.  IUD in place. Its upper aspect is 1 cm from fundus, in good position. No  pelvic free fluid or lymphadenopathy.           IMPRESSION:  Nonobstructing bilateral nephrolithiasis. No hydronephrosis.  IUD in the uterus, in good position.     I, Attending Radiologist Myron Prieto MD, have reviewed the images and  report and concur with these findings interpreted by Resident Radiologist,  Wilbur Canseco DO.     24 urine stone risk 07/2022  Only identifiable risk factors low urine volume of 1.25 L    ASSESSMENT/PLAN  36 year old female    Hx kidney stones  PAH- on fluid restriction  -remains on- orenitram- for PA fibrosis, will need repeat echo 1 mo after starting.     Bilateral renal calculi and 8mm left UPJ stone since 1/4/2022 s/p left URS, HLL and stent 3/10/22    Calcium phosphate greater than calcium oxalate stones  CT 07/2022 only with small 3 mm stones bilaterally    Last workup 2018 with Dr. Reddy    Metabolic stone disease workup only demonstrates low urine output for stone former of 1.25 L.  Unfortunate the patient cannot push fluids due to her pulmonary artery restriction    Plan:  1. If patient has a lot of secondary losses of fluid such as sweat she may push her fluids a little bit otherwise should adhere to her fluid restriction due to her cardiac condition   2. Follow-up 1 year with [facility] x-ray prior    Miles Brower MD

## 2022-12-23 NOTE — ED PROVIDER NOTES
ED Provider Note    CHIEF COMPLAINT  Chief Complaint   Patient presents with    Flank Pain     L flank pain starting at 0230 this morning sudden onset.   Pt reports no N/V/D.   Pt reports it feels like kidney stone pain.        HPI  Mitchel Sandy is a 27 y.o. male who presents for evaluation of left flank pain which started around 2 or 2:30 this morning.  It was rather sudden in nature and very sharp.  Patient states it is reminiscent of his previous renal stones.  He notes no fevers, chills, nausea, vomiting, or diarrhea.  He has had no recent trauma.  He notes no numbness or tingling to extremities and no shortness of breath or radiation of the pain    REVIEW OF SYSTEMS  Constitutional: No fevers or chills  Skin: No rashes  HEENT: No sore throat, runny nose, sores, trouble swallowing, trouble speaking.  Neck: No neck pain  Chest: No pain or rashes  Pulm: No shortness of breath, cough, wheezing, stridor, or pain with inspiration/expiration  Gastrointestinal: No nausea, vomiting, diarrhea, constipation, bloating, melena, hematochezia or abdominal pain.  Flank pain as noted above  Genitourinary: No dysuria or hematuria  Musculoskeletal: No recent trauma, pain, swelling, or focal weakness  Neurologic: No numbness, tingling, or focal motor weakness.    Heme: No bleeding or bruising problems.   Immuno: No hx of recurrent infections    PAST FAM HISTORY  Family History   Problem Relation Age of Onset    Sleep Apnea Mother     Alcohol abuse Father     Diabetes Father     Hyperlipidemia Maternal Grandfather     Heart Disease Paternal Grandmother          at age 55    Cancer Neg Hx        PAST MEDICAL HISTORY   has a past medical history of Hypertension and Kidney disease.    SOCIAL HISTORY  Social History     Tobacco Use    Smoking status: Former     Years: 8.00     Types: Cigarettes    Smokeless tobacco: Former     Types: Chew     Quit date:     Tobacco comments:     Pouches   Vaping Use    Vaping  "Use: Every day    Substances: Nicotine    Devices: Disposable   Substance and Sexual Activity    Alcohol use: Yes     Comment: occasionally     Drug use: Never     Comment: cbd oil    Sexual activity: Not on file       SURGICAL HISTORY   has a past surgical history that includes irrigation & debridement general (5/21/2013) and mayank by laparoscopy (4/28/2021).    CURRENT MEDICATIONS  Home Medications       Reviewed by Berta Taylor R.N. (Registered Nurse) on 12/23/22 at 0304  Med List Status: Complete     Medication Last Dose Status   amLODIPine (NORVASC) 5 MG Tab 12/22/2022 Active   Cholecalciferol 80448 UNIT Cap 12/17/2022 Active   Dulaglutide (TRULICITY) 0.75 MG/0.5ML Solution Pen-injector 12/17/2022 Active   metFORMIN ER (GLUCOPHAGE XR) 500 MG TABLET SR 24 HR 12/22/2022 Active                    ALLERGIES  Allergies   Allergen Reactions    Lisinopril      Patient getting daily headaches with lisinopril 2 hours after taking.     Horse Allergy        PHYSICAL EXAM  VITAL SIGNS: BP (!) 166/107   Pulse 69   Temp 36.7 °C (98 °F) (Temporal)   Resp 20   Ht 1.803 m (5' 11\")   Wt (!) 160 kg (351 lb 13.7 oz)   SpO2 95%   BMI 49.07 kg/m²    Gen: Awake, alert, occasionally grimacing  HEENT: No signs of trauma, Bilateral external ears normal, Nose normal. Conjunctiva normal, Non-icteric.   Cardiovascular: Regular rate and rhythm, no murmurs.  Capillary refill less than 3 seconds to all extremities, 2+ distal pulses.  Thorax & Lungs: Normal breath sounds, No respiratory distress, No wheezing bilateral chest rise  Abdomen: Bowel sounds normal, Soft, No tenderness, No masses, No pulsatile masses. No Guarding or rebound  Skin: Warm, Dry, No erythema, No rash noted to exposed areas.   Back: No bony tenderness, left CVA tenderness  Extremities: Intact distal pulses, No edema  Neurologic: Alert , no facial droop, grossly normal coordination and strength  Psychiatric: Affect pleasant      LABS  Results for orders " placed or performed during the hospital encounter of 12/23/22   CBC WITH DIFFERENTIAL   Result Value Ref Range    WBC 7.8 4.8 - 10.8 K/uL    RBC 5.72 4.70 - 6.10 M/uL    Hemoglobin 16.5 14.0 - 18.0 g/dL    Hematocrit 47.5 42.0 - 52.0 %    MCV 83.0 81.4 - 97.8 fL    MCH 28.8 27.0 - 33.0 pg    MCHC 34.7 33.7 - 35.3 g/dL    RDW 39.8 35.9 - 50.0 fL    Platelet Count 305 164 - 446 K/uL    MPV 8.7 (L) 9.0 - 12.9 fL    Neutrophils-Polys 58.20 44.00 - 72.00 %    Lymphocytes 31.20 22.00 - 41.00 %    Monocytes 7.00 0.00 - 13.40 %    Eosinophils 2.70 0.00 - 6.90 %    Basophils 0.50 0.00 - 1.80 %    Immature Granulocytes 0.40 0.00 - 0.90 %    Nucleated RBC 0.00 /100 WBC    Neutrophils (Absolute) 4.54 1.82 - 7.42 K/uL    Lymphs (Absolute) 2.44 1.00 - 4.80 K/uL    Monos (Absolute) 0.55 0.00 - 0.85 K/uL    Eos (Absolute) 0.21 0.00 - 0.51 K/uL    Baso (Absolute) 0.04 0.00 - 0.12 K/uL    Immature Granulocytes (abs) 0.03 0.00 - 0.11 K/uL    NRBC (Absolute) 0.00 K/uL   COMP METABOLIC PANEL   Result Value Ref Range    Sodium 142 135 - 145 mmol/L    Potassium 4.0 3.6 - 5.5 mmol/L    Chloride 106 96 - 112 mmol/L    Co2 21 20 - 33 mmol/L    Anion Gap 15.0 7.0 - 16.0    Glucose 105 (H) 65 - 99 mg/dL    Bun 19 8 - 22 mg/dL    Creatinine 0.86 0.50 - 1.40 mg/dL    Calcium 10.0 8.4 - 10.2 mg/dL    AST(SGOT) 35 12 - 45 U/L    ALT(SGPT) 67 (H) 2 - 50 U/L    Alkaline Phosphatase 88 30 - 99 U/L    Total Bilirubin 0.3 0.1 - 1.5 mg/dL    Albumin 4.9 3.2 - 4.9 g/dL    Total Protein 7.6 6.0 - 8.2 g/dL    Globulin 2.7 1.9 - 3.5 g/dL    A-G Ratio 1.8 g/dL   URINALYSIS (UA)    Specimen: Urine   Result Value Ref Range    Color Yellow     Character Clear     Specific Gravity >=1.030 <1.035    Ph 5.5 5.0 - 8.0    Glucose Negative Negative mg/dL    Ketones Negative Negative mg/dL    Protein Negative Negative mg/dL    Bilirubin Negative Negative    Nitrite Negative Negative    Leukocyte Esterase Negative Negative    Occult Blood Moderate (A) Negative    Micro  Urine Req Microscopic    URINE MICROSCOPIC (W/UA)   Result Value Ref Range    WBC 5-10 (A) /hpf    RBC 5-10 (A) /hpf    Bacteria Few (A) None /hpf    Epithelial Cells Few Few /hpf    Mucous Threads Few /hpf   CORRECTED CALCIUM   Result Value Ref Range    Correct Calcium 9.3 8.5 - 10.5 mg/dL   ESTIMATED GFR   Result Value Ref Range    GFR (CKD-EPI) 121 >60 mL/min/1.73 m 2       RADIOLOGY  CT-RENAL COLIC EVALUATION(A/P W/O)   Final Result         1.  1 mm distal left ureteral stone results in left urinary outflow tract obstructive changes   2.  Hepatomegaly and hepatic steatosis        I reviewed prescription monitoring program for patient's narcotic use before prescribing a scheduled drug.The patient will not drink alcohol nor drive with prescribed medications. The patient will return for new or worsening symptoms and is stable at the time of discharge.     The patient is referred to a primary physician for blood pressure management, diabetic screening, and for all other preventative health concerns.     In prescribing controlled substances to this patient, I certify that I have obtained and reviewed the medical history of the patient. I have also made a good abdelrahman effort to obtain applicable records from other providers who have treated the patient and records did not demonstrate any increased risk of substance abuse that would prevent me from prescribing controlled substances.      I have conducted a physical exam and documented it. I have reviewed the patient's prescription history as maintained by the [place] Prescription Monitoring Program.      I have assessed the patient’s risk for abuse, dependency, and addiction using the validated Opioid Risk Tool available at https://www.mdcalc.com/kmwibw-ydwj-bmpx-ort-narcotic-abuse.      Given the above, I believe the benefits of controlled substance therapy outweigh the risks. The reasons for prescribing controlled substances include in my professional opinion,  controlled substances are the only reasonable choice for this patient because over-the-counter medications are unlikely to control the pain adequately. Accordingly, I have discussed the risk and benefits, treatment plan, and alternative therapies with the patient.      COURSE & MEDICAL DECISION MAKING  Patient arrives for evaluation of symptoms that are highly suggestive of recurrent ureteral colic.  Patient does appear uncomfortable and I will treat him aggressively with morphine and Zofran as well as IV fluids until CT without contrast can be performed.  Laboratory evaluation will also be undertaken including CBC, CMP, and urinalysis.  At this point I do not suspect a significant infectious process in order I suspect vascular pathology as the cause of the patient's symptoms.    Patient symptoms appear to be related most likely to the 1 mm left UVJ stone.  There is mild urinary outflow obstructive changes but there is no evidence for infection or renal insufficiency.  Patient states understanding this and will treat symptomatically at home.  He will follow-up with his urologist as needed and return to the emergency department if his symptoms worsen or change    FINAL IMPRESSION  1. Ureteral stone        Electronically signed by: James Martinez M.D., 12/23/2022 3:11 AM

## 2023-03-29 ENCOUNTER — OFFICE VISIT (OUTPATIENT)
Dept: URGENT CARE | Facility: PHYSICIAN GROUP | Age: 28
End: 2023-03-29
Payer: COMMERCIAL

## 2023-03-29 ENCOUNTER — HOSPITAL ENCOUNTER (OUTPATIENT)
Dept: RADIOLOGY | Facility: MEDICAL CENTER | Age: 28
End: 2023-03-29
Attending: NURSE PRACTITIONER
Payer: COMMERCIAL

## 2023-03-29 VITALS
TEMPERATURE: 98.7 F | SYSTOLIC BLOOD PRESSURE: 166 MMHG | HEIGHT: 71 IN | WEIGHT: 315 LBS | HEART RATE: 86 BPM | RESPIRATION RATE: 16 BRPM | BODY MASS INDEX: 44.1 KG/M2 | DIASTOLIC BLOOD PRESSURE: 100 MMHG | OXYGEN SATURATION: 96 %

## 2023-03-29 DIAGNOSIS — Z86.39 H/O DIABETES MELLITUS: ICD-10-CM

## 2023-03-29 DIAGNOSIS — I10 ELEVATED BLOOD PRESSURE READING IN OFFICE WITH DIAGNOSIS OF HYPERTENSION: ICD-10-CM

## 2023-03-29 DIAGNOSIS — M79.89 PAIN AND SWELLING OF LEFT LOWER LEG: ICD-10-CM

## 2023-03-29 DIAGNOSIS — M79.662 PAIN AND SWELLING OF LEFT LOWER LEG: ICD-10-CM

## 2023-03-29 DIAGNOSIS — Z86.79 H/O: HTN (HYPERTENSION): ICD-10-CM

## 2023-03-29 PROCEDURE — 93971 EXTREMITY STUDY: CPT | Mod: LT

## 2023-03-29 PROCEDURE — 99213 OFFICE O/P EST LOW 20 MIN: CPT | Performed by: NURSE PRACTITIONER

## 2023-03-29 RX ORDER — TESTOSTERONE CYPIONATE 200 MG/ML
INJECTION, SOLUTION INTRAMUSCULAR
COMMUNITY
Start: 2023-02-23

## 2023-03-29 ASSESSMENT — ENCOUNTER SYMPTOMS
WEAKNESS: 0
MYALGIAS: 1
BRUISES/BLEEDS EASILY: 0
CHILLS: 0
SENSORY CHANGE: 0
LEG SWELLING: 1
FEVER: 0
FALLS: 0
TINGLING: 0

## 2023-03-29 ASSESSMENT — FIBROSIS 4 INDEX: FIB4 SCORE: 0.39

## 2023-03-29 NOTE — PROGRESS NOTES
"Subjective     Mitchel Jace Sandy is a 27 y.o. male who presents with Leg Swelling (L Leg,Losing circulation, Pain, swelling x 2 weeks. )            Leg Swelling  Associated symptoms include myalgias. Pertinent negatives include no chest pain, chills, coughing, fever, rash or weakness.    has been experiencing general left leg swelling and pain x2 weeks.   has had distal left lower extremity and ankle/foot swelling x1.5 weeks.   will experience feeling of \"tingling/numbness like when your feet fall asleep\" in his left foot. Does has history of hypertension and has been placed on \"beta-blockers\".  He admits that he has not taken his medications consistently and has not taken them in the last 2 days.   pain and swelling in left leg started after he climbed stairs at work 2 weeks ago then went away then returned worse about a week ago.  States pain is consistent with sitting or standing/ambulating.  Denies redness or heat to skin.  Denies chest pain/pressure, shortness of breath, abdominal pain or back pain, extremity weakness or headache.  Admits to poor diet choices in the last 3 weeks with some weight gain.  Denies neuropathy or kidney dysfunction.   has started testosterone injections every 4 weeks as well and newly diagnosed diabetes and does take metformin and Trulicity.  Has not had any blood work since starting medications 3 months ago.  Does have appointment with his primary care provider next week.    PMH:  has a past medical history of Hypertension and Kidney disease.    He has no past medical history of Asthma.  MEDS:   Current Outpatient Medications:     testosterone cypionate (DEPO-TESTOSTERONE) 200 MG/ML Solution injection, ADMINISTER 1 ML IN THE MUSCLE EVERY 2 WEEKS, Disp: , Rfl:     amLODIPine (NORVASC) 5 MG Tab, Take 1 Tablet by mouth every day., Disp: 90 Tablet, Rfl: 1    Cholecalciferol 06727 UNIT Cap, Take 1 Capsule by mouth every 7 days., Disp: 12 Capsule, " Rfl: 1    metFORMIN ER (GLUCOPHAGE XR) 500 MG TABLET SR 24 HR, Take 2 Tablets by mouth every day., Disp: 180 Tablet, Rfl: 1    Dulaglutide (TRULICITY) 0.75 MG/0.5ML Solution Pen-injector, Inject 0.5 mL under the skin every 7 days., Disp: 6 mL, Rfl: 3    ondansetron (ZOFRAN ODT) 4 MG TABLET DISPERSIBLE, Take 1 Tablet by mouth every 8 hours as needed for Nausea/Vomiting. (Patient not taking: Reported on 3/29/2023), Disp: 10 Tablet, Rfl: 0  ALLERGIES:   Allergies   Allergen Reactions    Lisinopril      Patient getting daily headaches with lisinopril 2 hours after taking.     Horse Allergy      SURGHX:   Past Surgical History:   Procedure Laterality Date    SHARON BY LAPAROSCOPY  4/28/2021    Procedure: CHOLECYSTECTOMY, LAPAROSCOPIC;  Surgeon: Tarik Malik M.D.;  Location: SURGERY [facility];  Service: General    IRRIGATION & DEBRIDEMENT GENERAL  5/21/2013    Performed by Dwight Pool M.D. at [facility]     SOCHX:  reports that he has quit smoking. His smoking use included cigarettes. He quit smokeless tobacco use about 2 years ago.  His smokeless tobacco use included chew. He reports current alcohol use. He reports that he does not use drugs.  FH: Family history was reviewed, no pertinent findings to report      Review of Systems   Constitutional:  Negative for chills, fever and malaise/fatigue.   Respiratory:  Negative for cough, shortness of breath and wheezing.    Cardiovascular:  Positive for leg swelling. Negative for chest pain, palpitations and orthopnea.   Gastrointestinal: Negative.    Musculoskeletal:  Positive for myalgias. Negative for falls and joint pain.   Skin:  Negative for itching and rash.   Neurological:  Negative for tingling, sensory change and weakness.   Endo/Heme/Allergies:  Does not bruise/bleed easily.   All other systems reviewed and are negative.           Objective     BP (!) 166/100 (BP Location: Right arm, Patient Position: Sitting, BP Cuff Size: Large adult)   " Pulse 86   Temp 37.1 °C (98.7 °F) (Temporal)   Resp 16   Ht 1.803 m (5' 11\")   Wt (!) 156 kg (343 lb 12.8 oz)   SpO2 96%   BMI 47.95 kg/m²      Physical Exam  Vitals reviewed.   Constitutional:       General: He is awake. He is not in acute distress.     Appearance: Normal appearance. He is not ill-appearing, toxic-appearing or diaphoretic.   Cardiovascular:      Rate and Rhythm: Normal rate.   Pulmonary:      Effort: Pulmonary effort is normal.   Musculoskeletal:      Left lower leg: Swelling and tenderness present. No deformity, lacerations or bony tenderness. 2+ Edema present.      Left foot: Normal range of motion. No deformity, bunion, Charcot foot, foot drop or prominent metatarsal heads.      Comments: Taught skin at distal aspect of LLE from swelling without weeping, skin discoloration or temperature changes. Skin p/w/d, skin sensation intact. Mild tenderness to touch at distal aspect of LLE only. No pitting edema. FROM of ankle joint and flex/extend of left foot. No foot drop or leg weakness. Equal calf size.    Feet:      Left foot:      Protective Sensation: 10 sites tested.  10 sites sensed.      Skin integrity: Skin integrity normal.   Skin:     General: Skin is warm and dry.      Findings: No abrasion, bruising, ecchymosis, erythema, signs of injury, laceration, petechiae, rash or wound.   Neurological:      Mental Status: He is alert and oriented to person, place, and time.      GCS: GCS eye subscore is 4. GCS verbal subscore is 5. GCS motor subscore is 6.      Sensory: Sensation is intact.      Motor: Motor function is intact.      Coordination: Coordination is intact.      Gait: Gait is intact.   Psychiatric:         Attention and Perception: Attention normal.         Mood and Affect: Mood normal.         Speech: Speech normal.         Behavior: Behavior normal. Behavior is cooperative.                           Assessment & Plan        1. Pain and swelling of left lower leg    - " US-EXTREMITY VENOUS LOWER UNILAT LEFT; Future    2. H/O: HTN (hypertension)    3. H/O diabetes mellitus      4. Elevated blood pressure reading in office with diagnosis of hypertension    No indication of DVT  -Recommend compression stockings/knee high socks to prevent leg swelling  -Leg elevation until swelling improved  -Recommend to take all medications as prescribed daily  -Avoid high salt, processed, high fatty foods  -Incorporate a healthy balanced diet of fruits, vegetables and lean meats  -Maintain hydration intake and decrease caffeine and sugary drinks  -Invest in BP machine to monitor BP  -Keep log of BP readings for future PCP appointment   -Recheck with continuous elevated BP readings >150/90 with symptoms  -Monitor for signs of elevated HTN like dizziness, severe headache, vision change, nausea/vomiting, numbness/continued swelling in lower extremities, shortness of breath, chest pain/pressure- must be evaluated immediately in urgent care or emergency room  -Keep appointment with primary care next week to discuss blood pressure numbers and lower leg edema

## 2023-03-30 ASSESSMENT — ENCOUNTER SYMPTOMS
GASTROINTESTINAL NEGATIVE: 1
COUGH: 0
ORTHOPNEA: 0
PALPITATIONS: 0
WHEEZING: 0
SHORTNESS OF BREATH: 0

## 2023-04-04 ENCOUNTER — OFFICE VISIT (OUTPATIENT)
Dept: MEDICAL GROUP | Facility: PHYSICIAN GROUP | Age: 28
End: 2023-04-04
Payer: COMMERCIAL

## 2023-04-04 ENCOUNTER — HOSPITAL ENCOUNTER (OUTPATIENT)
Dept: LAB | Facility: MEDICAL CENTER | Age: 28
End: 2023-04-04
Attending: NURSE PRACTITIONER
Payer: COMMERCIAL

## 2023-04-04 VITALS
DIASTOLIC BLOOD PRESSURE: 92 MMHG | HEIGHT: 70 IN | TEMPERATURE: 98.9 F | BODY MASS INDEX: 45.1 KG/M2 | OXYGEN SATURATION: 96 % | SYSTOLIC BLOOD PRESSURE: 132 MMHG | WEIGHT: 315 LBS | HEART RATE: 91 BPM

## 2023-04-04 DIAGNOSIS — E78.49 OTHER HYPERLIPIDEMIA: ICD-10-CM

## 2023-04-04 DIAGNOSIS — M79.89 LEFT LEG SWELLING: ICD-10-CM

## 2023-04-04 DIAGNOSIS — M54.32 SCIATICA OF LEFT SIDE: ICD-10-CM

## 2023-04-04 DIAGNOSIS — R63.5 ABNORMAL WEIGHT GAIN: ICD-10-CM

## 2023-04-04 DIAGNOSIS — R29.818 SUSPECTED SLEEP APNEA: ICD-10-CM

## 2023-04-04 LAB
CRP SERPL HS-MCNC: 5.7 MG/L (ref 0–3)
NT-PROBNP SERPL IA-MCNC: <5 PG/ML (ref 0–125)

## 2023-04-04 PROCEDURE — 83880 ASSAY OF NATRIURETIC PEPTIDE: CPT

## 2023-04-04 PROCEDURE — 86141 C-REACTIVE PROTEIN HS: CPT

## 2023-04-04 PROCEDURE — 99214 OFFICE O/P EST MOD 30 MIN: CPT | Performed by: NURSE PRACTITIONER

## 2023-04-04 PROCEDURE — 36415 COLL VENOUS BLD VENIPUNCTURE: CPT

## 2023-04-04 RX ORDER — HYDROCHLOROTHIAZIDE 25 MG/1
25 TABLET ORAL DAILY
Qty: 30 TABLET | Refills: 2 | Status: SHIPPED | OUTPATIENT
Start: 2023-04-04

## 2023-04-04 ASSESSMENT — FIBROSIS 4 INDEX: FIB4 SCORE: 0.39

## 2023-04-04 ASSESSMENT — PATIENT HEALTH QUESTIONNAIRE - PHQ9: CLINICAL INTERPRETATION OF PHQ2 SCORE: 0

## 2023-04-04 NOTE — PROGRESS NOTES
I attest that I saw the patient with Nette [employer] student. I performed a physical exam and medical decision making. I reviewed, verified the documentation and agree with the content and plan as written by the nurse practitioner student.
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
## 2023-04-10 ENCOUNTER — APPOINTMENT (OUTPATIENT)
Dept: RADIOLOGY | Facility: MEDICAL CENTER | Age: 28
End: 2023-04-10
Attending: NURSE PRACTITIONER
Payer: COMMERCIAL

## 2023-05-01 ENCOUNTER — TELEPHONE (OUTPATIENT)
Dept: SLEEP MEDICINE | Facility: MEDICAL CENTER | Age: 28
End: 2023-05-01
Payer: COMMERCIAL

## 2023-05-01 NOTE — TELEPHONE ENCOUNTER
05-01-23  1st NO SHOW  Date of No Show: 04-28-23  Provider: Dayna  Reason For Visit: NP/Sleep  Outcome of call:    Pt hung up  Reason Missed:   ACM

## (undated) DEVICE — BLADE SURGICAL #15 - (50/BX 3BX/CA)

## (undated) DEVICE — NEPTUNE 4 PORT MANIFOLD - (20/PK)

## (undated) DEVICE — CHLORAPREP 26 ML APPLICATOR - ORANGE TINT(25/CA)

## (undated) DEVICE — TUBING CLEARLINK DUO-VENT - C-FLO (48EA/CA)

## (undated) DEVICE — HEMOSTAT SURG ABSORBABLE - 4 X 8 IN SURGICEL (24EA/CA)

## (undated) DEVICE — SUTURE 4-0 MONOCRYL PLUS PS-2 - 27 INCH (36/BX)

## (undated) DEVICE — SCISSORS 5MM CVD (6EA/BX)

## (undated) DEVICE — SODIUM CHL IRRIGATION 0.9% 1000ML (12EA/CA)

## (undated) DEVICE — SUCTION INSTRUMENT YANKAUER BULBOUS TIP W/O VENT (50EA/CA)

## (undated) DEVICE — SET LEADWIRE 5 LEAD BEDSIDE DISPOSABLE ECG (1SET OF 5/EA)

## (undated) DEVICE — GLOVE BIOGEL PI INDICATOR SZ 6.5 SURGICAL PF LF - (50/BX 4BX/CA)

## (undated) DEVICE — LACTATED RINGERS INJ 1000 ML - (14EA/CA 60CA/PF)

## (undated) DEVICE — ELECTRODE 850 FOAM ADHESIVE - HYDROGEL RADIOTRNSPRNT (50/PK)

## (undated) DEVICE — SENSOR SPO2 NEO LNCS ADHESIVE (20/BX) SEE USER NOTES

## (undated) DEVICE — PACK LAP CHOLE OR - (2EA/CA)

## (undated) DEVICE — BAG RETRIEVAL 10ML (10EA/BX)

## (undated) DEVICE — GLOVE SZ 6.5 BIOGEL PI MICRO - PF LF (50PR/BX)

## (undated) DEVICE — SET TUBING PNEUMOCLEAR HIGH FLOW SMOKE EVACUATION (10EA/BX)

## (undated) DEVICE — MASK ANESTHESIA ADULT  - (100/CA)

## (undated) DEVICE — APPLIER 5MM MED/LARGE CLIP - (3/BX)

## (undated) DEVICE — GLOVE BIOGEL PI INDICATOR SZ 8.0 SURGICAL PF LF -(50/BX 4BX/CA)

## (undated) DEVICE — CANISTER SUCTION 3000ML MECHANICAL FILTER AUTO SHUTOFF MEDI-VAC NONSTERILE LF DISP  (40EA/CA)

## (undated) DEVICE — SYRINGE SAFETY TB 1 ML 27 GA X 1/2 IN (100/BX 4BX/CA)

## (undated) DEVICE — SET SUCTION/IRRIGATION WITH DISPOSABLE TIP (6/CA )PART #0250-070-520 IS A SUB

## (undated) DEVICE — SYSTEM CLEARIFY VISUALIZATION (10EA/PK)

## (undated) DEVICE — PROTECTOR ULNA NERVE - (36PR/CA)

## (undated) DEVICE — HEAD HOLDER JUNIOR/ADULT

## (undated) DEVICE — SUTURE GENERAL

## (undated) DEVICE — GLOVE BIOGEL SZ 8 SURGICAL PF LTX - (50PR/BX 4BX/CA)

## (undated) DEVICE — MAT PATIENT POSITIONING PREVALON (10EA/CA)

## (undated) DEVICE — TROCAR LAPSCP 100MM 12MM NTHRD - (6/BX)

## (undated) DEVICE — KIT ANESTHESIA W/CIRCUIT & 3/LT BAG W/FILTER (20EA/CA)

## (undated) DEVICE — GLOVE SZ 7.5 BIOGEL PI MICRO - PF LF (50PR/BX)

## (undated) DEVICE — SUTURE 0 VICRYL PLUS UR-6 - 27 INCH (36/BX)

## (undated) DEVICE — CANNULA W/SEAL 5X100 Z-THRE - ADED KII (12/BX)

## (undated) DEVICE — ELECTRODE DUAL RETURN W/ CORD - (50/PK)

## (undated) DEVICE — SLEEVE, VASO, THIGH, MED

## (undated) DEVICE — SET EXTENSION WITH 2 PORTS (48EA/CA) ***PART #2C8610 IS A SUBSTITUTE*****

## (undated) DEVICE — TOWEL STOP TIMEOUT SAFETY FLAG (40EA/CA)